# Patient Record
Sex: FEMALE | Race: WHITE | NOT HISPANIC OR LATINO | Employment: OTHER | URBAN - METROPOLITAN AREA
[De-identification: names, ages, dates, MRNs, and addresses within clinical notes are randomized per-mention and may not be internally consistent; named-entity substitution may affect disease eponyms.]

---

## 2018-05-30 ENCOUNTER — HISTORICAL (OUTPATIENT)
Dept: ADMINISTRATIVE | Facility: HOSPITAL | Age: 68
End: 2018-05-30

## 2018-05-30 LAB
ALBUMIN SERPL-MCNC: 3.5 GM/DL (ref 3.4–5)
ALBUMIN/GLOB SERPL: 1 {RATIO}
ALP SERPL-CCNC: 75 UNIT/L (ref 38–126)
ALT SERPL-CCNC: 19 UNIT/L (ref 12–78)
AST SERPL-CCNC: 13 UNIT/L (ref 15–37)
BILIRUB SERPL-MCNC: 0.6 MG/DL (ref 0.2–1)
BILIRUBIN DIRECT+TOT PNL SERPL-MCNC: 0.1 MG/DL (ref 0–0.2)
BILIRUBIN DIRECT+TOT PNL SERPL-MCNC: 0.5 MG/DL (ref 0–0.8)
BUN SERPL-MCNC: 15 MG/DL (ref 7–18)
CALCIUM SERPL-MCNC: 8.8 MG/DL (ref 8.5–10.1)
CHLORIDE SERPL-SCNC: 108 MMOL/L (ref 98–107)
CHOLEST SERPL-MCNC: 160 MG/DL (ref 0–200)
CHOLEST/HDLC SERPL: 3.3 {RATIO} (ref 0–4)
CO2 SERPL-SCNC: 27 MMOL/L (ref 21–32)
CREAT SERPL-MCNC: 0.72 MG/DL (ref 0.55–1.02)
DEPRECATED CALCIDIOL+CALCIFEROL SERPL-MC: 27 NG/ML (ref 30–80)
GLOBULIN SER-MCNC: 3.4 GM/DL (ref 2.4–3.5)
GLUCOSE SERPL-MCNC: 94 MG/DL (ref 74–106)
HDLC SERPL-MCNC: 48 MG/DL (ref 35–60)
LDLC SERPL CALC-MCNC: 98 MG/DL (ref 0–129)
POTASSIUM SERPL-SCNC: 4.4 MMOL/L (ref 3.5–5.1)
PROT SERPL-MCNC: 6.9 GM/DL (ref 6.4–8.2)
SODIUM SERPL-SCNC: 143 MMOL/L (ref 136–145)
TRIGL SERPL-MCNC: 70 MG/DL (ref 30–150)
VLDLC SERPL CALC-MCNC: 14 MG/DL

## 2018-06-04 ENCOUNTER — HISTORICAL (OUTPATIENT)
Dept: ADMINISTRATIVE | Facility: HOSPITAL | Age: 68
End: 2018-06-04

## 2018-07-25 ENCOUNTER — HISTORICAL (OUTPATIENT)
Dept: ADMINISTRATIVE | Facility: HOSPITAL | Age: 68
End: 2018-07-25

## 2018-08-14 ENCOUNTER — HISTORICAL (OUTPATIENT)
Dept: RADIOLOGY | Facility: HOSPITAL | Age: 68
End: 2018-08-14

## 2018-09-28 ENCOUNTER — HISTORICAL (OUTPATIENT)
Dept: RADIOLOGY | Facility: HOSPITAL | Age: 68
End: 2018-09-28

## 2018-11-20 ENCOUNTER — HISTORICAL (OUTPATIENT)
Dept: LAB | Facility: HOSPITAL | Age: 68
End: 2018-11-20

## 2018-11-20 LAB
ABS NEUT (OLG): 4.27 X10(3)/MCL (ref 2.1–9.2)
ALBUMIN SERPL-MCNC: 3.6 GM/DL (ref 3.4–5)
ALBUMIN/GLOB SERPL: 1.1 RATIO (ref 1.1–2)
ALP SERPL-CCNC: 74 UNIT/L (ref 38–126)
ALT SERPL-CCNC: 23 UNIT/L (ref 12–78)
AST SERPL-CCNC: 16 UNIT/L (ref 15–37)
BASOPHILS # BLD AUTO: 0 X10(3)/MCL (ref 0–0.2)
BASOPHILS NFR BLD AUTO: 0 %
BILIRUB SERPL-MCNC: 0.3 MG/DL (ref 0.2–1)
BILIRUBIN DIRECT+TOT PNL SERPL-MCNC: 0.1 MG/DL (ref 0–0.5)
BILIRUBIN DIRECT+TOT PNL SERPL-MCNC: 0.2 MG/DL (ref 0–0.8)
BUN SERPL-MCNC: 15 MG/DL (ref 7–18)
CALCIUM SERPL-MCNC: 8.6 MG/DL (ref 8.5–10.1)
CHLORIDE SERPL-SCNC: 109 MMOL/L (ref 98–107)
CO2 SERPL-SCNC: 25 MMOL/L (ref 21–32)
CREAT SERPL-MCNC: 0.8 MG/DL (ref 0.55–1.02)
EOSINOPHIL # BLD AUTO: 0.5 X10(3)/MCL (ref 0–0.9)
EOSINOPHIL NFR BLD AUTO: 6 %
ERYTHROCYTE [DISTWIDTH] IN BLOOD BY AUTOMATED COUNT: 12.5 % (ref 11.5–17)
GLOBULIN SER-MCNC: 3.2 GM/DL (ref 2.4–3.5)
GLUCOSE SERPL-MCNC: 98 MG/DL (ref 74–106)
HCT VFR BLD AUTO: 42.3 % (ref 37–47)
HGB BLD-MCNC: 13.6 GM/DL (ref 12–16)
LYMPHOCYTES # BLD AUTO: 3.2 X10(3)/MCL (ref 0.6–4.6)
LYMPHOCYTES NFR BLD AUTO: 37 %
MCH RBC QN AUTO: 29.6 PG (ref 27–31)
MCHC RBC AUTO-ENTMCNC: 32.2 GM/DL (ref 33–36)
MCV RBC AUTO: 92 FL (ref 80–94)
MONOCYTES # BLD AUTO: 0.7 X10(3)/MCL (ref 0.1–1.3)
MONOCYTES NFR BLD AUTO: 8 %
NEUTROPHILS # BLD AUTO: 4.27 X10(3)/MCL (ref 2.1–9.2)
NEUTROPHILS NFR BLD AUTO: 49 %
PLATELET # BLD AUTO: 368 X10(3)/MCL (ref 130–400)
PMV BLD AUTO: 9.4 FL (ref 9.4–12.4)
POTASSIUM SERPL-SCNC: 3.8 MMOL/L (ref 3.5–5.1)
PROT SERPL-MCNC: 6.8 GM/DL (ref 6.4–8.2)
RBC # BLD AUTO: 4.6 X10(6)/MCL (ref 4.2–5.4)
SODIUM SERPL-SCNC: 142 MMOL/L (ref 136–145)
TSH SERPL-ACNC: 1.98 MIU/L (ref 0.36–3.74)
WBC # SPEC AUTO: 8.8 X10(3)/MCL (ref 4.5–11.5)

## 2018-11-29 ENCOUNTER — HISTORICAL (OUTPATIENT)
Dept: CARDIOLOGY | Facility: HOSPITAL | Age: 68
End: 2018-11-29

## 2019-10-25 ENCOUNTER — HISTORICAL (OUTPATIENT)
Dept: ADMINISTRATIVE | Facility: HOSPITAL | Age: 69
End: 2019-10-25

## 2019-10-25 LAB
ALBUMIN SERPL-MCNC: 3.3 GM/DL (ref 3.4–5)
ALBUMIN/GLOB SERPL: 1.1 {RATIO}
ALP SERPL-CCNC: 80 UNIT/L (ref 38–126)
ALT SERPL-CCNC: 23 UNIT/L (ref 12–78)
AST SERPL-CCNC: 16 UNIT/L (ref 15–37)
BILIRUB SERPL-MCNC: 1.2 MG/DL (ref 0.2–1)
BILIRUBIN DIRECT+TOT PNL SERPL-MCNC: 0.2 MG/DL (ref 0–0.2)
BILIRUBIN DIRECT+TOT PNL SERPL-MCNC: 1 MG/DL (ref 0–0.8)
BUN SERPL-MCNC: 10 MG/DL (ref 7–18)
CALCIUM SERPL-MCNC: 8.9 MG/DL (ref 8.5–10.1)
CHLORIDE SERPL-SCNC: 107 MMOL/L (ref 98–107)
CHOLEST SERPL-MCNC: 169 MG/DL (ref 0–200)
CHOLEST/HDLC SERPL: 2.9 {RATIO} (ref 0–4)
CO2 SERPL-SCNC: 28 MMOL/L (ref 21–32)
CREAT SERPL-MCNC: 0.61 MG/DL (ref 0.55–1.02)
DEPRECATED CALCIDIOL+CALCIFEROL SERPL-MC: 32.66 NG/ML (ref 30–80)
GLOBULIN SER-MCNC: 3.1 GM/DL (ref 2.4–3.5)
GLUCOSE SERPL-MCNC: 100 MG/DL (ref 74–106)
HDLC SERPL-MCNC: 58 MG/DL (ref 35–60)
LDLC SERPL CALC-MCNC: 97 MG/DL (ref 0–129)
POTASSIUM SERPL-SCNC: 4.6 MMOL/L (ref 3.5–5.1)
PROT SERPL-MCNC: 6.4 GM/DL (ref 6.4–8.2)
SODIUM SERPL-SCNC: 142 MMOL/L (ref 136–145)
TRIGL SERPL-MCNC: 72 MG/DL (ref 30–150)
TSH SERPL-ACNC: 1.6 MIU/L (ref 0.36–3.74)
VLDLC SERPL CALC-MCNC: 14 MG/DL

## 2020-09-23 ENCOUNTER — HISTORICAL (OUTPATIENT)
Dept: ADMINISTRATIVE | Facility: HOSPITAL | Age: 70
End: 2020-09-23

## 2020-09-23 LAB
ALBUMIN SERPL-MCNC: 3.4 GM/DL (ref 3.4–5)
ALBUMIN/GLOB SERPL: 1.4 RATIO (ref 1.1–2)
ALP SERPL-CCNC: 57 UNIT/L (ref 40–150)
ALT SERPL-CCNC: 24 UNIT/L (ref 0–55)
AST SERPL-CCNC: 19 UNIT/L (ref 5–34)
BILIRUB SERPL-MCNC: 0.4 MG/DL
BILIRUBIN DIRECT+TOT PNL SERPL-MCNC: 0.2 MG/DL (ref 0–0.5)
BILIRUBIN DIRECT+TOT PNL SERPL-MCNC: 0.2 MG/DL (ref 0–0.8)
BUN SERPL-MCNC: 9.9 MG/DL (ref 9.8–20.1)
CALCIUM SERPL-MCNC: 8.3 MG/DL (ref 8.4–10.2)
CHLORIDE SERPL-SCNC: 109 MMOL/L (ref 98–107)
CHOLEST SERPL-MCNC: 145 MG/DL
CHOLEST/HDLC SERPL: 4 {RATIO} (ref 0–5)
CO2 SERPL-SCNC: 27 MMOL/L (ref 23–31)
CREAT SERPL-MCNC: 0.63 MG/DL (ref 0.55–1.02)
DEPRECATED CALCIDIOL+CALCIFEROL SERPL-MC: 37.7 NG/ML (ref 6.6–49.9)
GLOBULIN SER-MCNC: 2.4 GM/DL (ref 2.4–3.5)
GLUCOSE SERPL-MCNC: 109 MG/DL (ref 82–115)
HDLC SERPL-MCNC: 37 MG/DL (ref 35–60)
LDLC SERPL CALC-MCNC: 99 MG/DL (ref 50–140)
POTASSIUM SERPL-SCNC: 4.3 MMOL/L (ref 3.5–5.1)
PROT SERPL-MCNC: 5.8 GM/DL (ref 5.8–7.6)
SODIUM SERPL-SCNC: 140 MMOL/L (ref 136–145)
TRIGL SERPL-MCNC: 46 MG/DL (ref 37–140)
VLDLC SERPL CALC-MCNC: 9 MG/DL

## 2020-10-20 ENCOUNTER — HISTORICAL (OUTPATIENT)
Dept: ADMINISTRATIVE | Facility: HOSPITAL | Age: 70
End: 2020-10-20

## 2020-12-08 ENCOUNTER — HISTORICAL (OUTPATIENT)
Dept: RADIOLOGY | Facility: HOSPITAL | Age: 70
End: 2020-12-08

## 2020-12-18 LAB — CRC RECOMMENDATION EXT: NORMAL

## 2021-05-13 ENCOUNTER — HISTORICAL (OUTPATIENT)
Dept: RADIOLOGY | Facility: HOSPITAL | Age: 71
End: 2021-05-13

## 2021-05-13 LAB — BMD RECOMMENDATION EXT: NORMAL

## 2021-05-31 ENCOUNTER — HISTORICAL (OUTPATIENT)
Dept: INTENSIVE CARE | Facility: HOSPITAL | Age: 71
End: 2021-05-31

## 2021-06-16 ENCOUNTER — HISTORICAL (OUTPATIENT)
Dept: INTENSIVE CARE | Facility: HOSPITAL | Age: 71
End: 2021-06-16

## 2021-11-04 ENCOUNTER — HISTORICAL (OUTPATIENT)
Dept: ADMINISTRATIVE | Facility: HOSPITAL | Age: 71
End: 2021-11-04

## 2021-11-04 LAB
ABS NEUT (OLG): 4.15 X10(3)/MCL (ref 2.1–9.2)
ALBUMIN SERPL-MCNC: 3.5 GM/DL (ref 3.4–4.8)
ALBUMIN/GLOB SERPL: 1.2 RATIO (ref 1.1–2)
ALP SERPL-CCNC: 69 UNIT/L (ref 40–150)
ALT SERPL-CCNC: 27 UNIT/L (ref 0–55)
AST SERPL-CCNC: 23 UNIT/L (ref 5–34)
BASOPHILS # BLD AUTO: 0 X10(3)/MCL (ref 0–0.2)
BASOPHILS NFR BLD AUTO: 0 %
BILIRUB SERPL-MCNC: 0.5 MG/DL
BILIRUBIN DIRECT+TOT PNL SERPL-MCNC: 0.2 MG/DL (ref 0–0.5)
BILIRUBIN DIRECT+TOT PNL SERPL-MCNC: 0.3 MG/DL (ref 0–0.8)
BUN SERPL-MCNC: 10.9 MG/DL (ref 9.8–20.1)
CALCIUM SERPL-MCNC: 9.2 MG/DL (ref 8.7–10.5)
CHLORIDE SERPL-SCNC: 108 MMOL/L (ref 98–107)
CHOLEST SERPL-MCNC: 156 MG/DL
CHOLEST/HDLC SERPL: 4 {RATIO} (ref 0–5)
CO2 SERPL-SCNC: 28 MMOL/L (ref 23–31)
CREAT SERPL-MCNC: 0.69 MG/DL (ref 0.55–1.02)
EOSINOPHIL # BLD AUTO: 0.2 X10(3)/MCL (ref 0–0.9)
EOSINOPHIL NFR BLD AUTO: 3 %
ERYTHROCYTE [DISTWIDTH] IN BLOOD BY AUTOMATED COUNT: 13 % (ref 11.5–17)
GLOBULIN SER-MCNC: 2.9 GM/DL (ref 2.4–3.5)
GLUCOSE SERPL-MCNC: 107 MG/DL (ref 82–115)
HCT VFR BLD AUTO: 40.5 % (ref 37–47)
HDLC SERPL-MCNC: 42 MG/DL (ref 35–60)
HGB BLD-MCNC: 13.2 GM/DL (ref 12–16)
LDLC SERPL CALC-MCNC: 95 MG/DL (ref 50–140)
LYMPHOCYTES # BLD AUTO: 1.9 X10(3)/MCL (ref 0.6–4.6)
LYMPHOCYTES NFR BLD AUTO: 27 %
MCH RBC QN AUTO: 30.2 PG (ref 27–31)
MCHC RBC AUTO-ENTMCNC: 32.6 GM/DL (ref 33–36)
MCV RBC AUTO: 92.7 FL (ref 80–94)
MONOCYTES # BLD AUTO: 0.8 X10(3)/MCL (ref 0.1–1.3)
MONOCYTES NFR BLD AUTO: 11 %
NEUTROPHILS # BLD AUTO: 4.15 X10(3)/MCL (ref 2.1–9.2)
NEUTROPHILS NFR BLD AUTO: 59 %
PLATELET # BLD AUTO: 369 X10(3)/MCL (ref 130–400)
PMV BLD AUTO: 9.2 FL (ref 9.4–12.4)
POTASSIUM SERPL-SCNC: 4.6 MMOL/L (ref 3.5–5.1)
PROT SERPL-MCNC: 6.4 GM/DL (ref 5.8–7.6)
RBC # BLD AUTO: 4.37 X10(6)/MCL (ref 4.2–5.4)
SODIUM SERPL-SCNC: 143 MMOL/L (ref 136–145)
TRIGL SERPL-MCNC: 95 MG/DL (ref 37–140)
VLDLC SERPL CALC-MCNC: 19 MG/DL
WBC # SPEC AUTO: 7.1 X10(3)/MCL (ref 4.5–11.5)

## 2022-04-10 ENCOUNTER — HISTORICAL (OUTPATIENT)
Dept: ADMINISTRATIVE | Facility: HOSPITAL | Age: 72
End: 2022-04-10

## 2022-04-29 VITALS
SYSTOLIC BLOOD PRESSURE: 126 MMHG | OXYGEN SATURATION: 96 % | WEIGHT: 190 LBS | WEIGHT: 191 LBS | HEIGHT: 67 IN | SYSTOLIC BLOOD PRESSURE: 130 MMHG | BODY MASS INDEX: 30.29 KG/M2 | BODY MASS INDEX: 29.82 KG/M2 | HEIGHT: 67 IN | HEIGHT: 67 IN | WEIGHT: 193 LBS | DIASTOLIC BLOOD PRESSURE: 64 MMHG | SYSTOLIC BLOOD PRESSURE: 140 MMHG | DIASTOLIC BLOOD PRESSURE: 68 MMHG | BODY MASS INDEX: 29.98 KG/M2 | DIASTOLIC BLOOD PRESSURE: 79 MMHG

## 2022-05-03 NOTE — HISTORICAL [facility] C[facility]ER
This is a historical note converted from Stephan. Formatting and pictures may have been removed.  Please reference Stephan for original formatting and attached multimedia. Chief Complaint  left hand ring finger pain, started beginning of october. no injury, no previous surgery to her finger.  History of Present Illness  This 70-year-old comes in for her?left hand.? She is complaining of pain in her left fourth finger.? She also had a myriad of other complaints.? Patient states that she?had bilateral mastectomies with implants in the past.? She is complaining of pain in her axilla radiating down of?her arm. ?She is right?hand dominant.  Review of Systems  Constitutional: No fever, weakness, or fatigue.  Ear/Nose/Mouth/Throat: No nasal congestion or sore throat.  Respiratory: No shortness of breath or cough.  Cardiovascular: No chest pain, palpitations, or peripheral edema.  Gastrointestinal: No nausea, vomiting, or abdominal pain.  Genitourinary: No dysuria.  Musculoskeletal: See current complaints?multiple joint complaints  Integumentary: Negative.  Physical Exam  Vitals & Measurements  T:?97.4? ?F (Oral)? HR:?74(Peripheral)? BP:?140/79?  HT:?170.00?cm? WT:?86.630?kg? BMI:?29.98?  Physical examination shows that the patient has a positive Tinels for ulnar nerve entrapment at the wrist. ?She has fairly classic ulnar nerve symptoms.? The patient has weakness in the long flexors?to fingers 4 and 5.? She has no evidence of carpal tunnel syndrome.? She has no evidence of?cervical radiculopathy.? She is otherwise motor and sensory intact.? Patient does have early arthritic changes in her fingers?more symptomatic at the left fourth PIP joint.? She has no abnormalities noted. ?There is no instability at her left?PIP joint and the fourth finger.  ?  AP lateral and oblique of the left hand shows early arthritic changes in the DIP and PIP joints of her left hand. ?She has no significant CMC  arthritis.  Assessment/Plan  1.?Primary osteoarthritis, left hand?M19.042  ?The findings were reviewed with the patient and she expressed understanding.? Patient?is advised to use over-the-counter anti-inflammatory gel for her fourth finger.? As far as her ulnar nerve symptoms I would recommend nerve conduction studies.? The patient is considering having?these done but does not want to schedule today.? Should she need?follow-up for her back or her knee I would recommend she see Dr. Bullock.? She will call and let me know how she would like to proceed.  ?  Components of this note were documented using voice recognition systems and are subject to errors not corrected at proof reading. ?Please contact the author for any clarifications.  Ordered:  Office/Outpatient Visit Level 3 New [id number] PC, Primary osteoarthritis, left hand  Neuritis of left ulnar nerve, [facility], 10/20/20 10:28:00 CDT  ?  2.?Neuritis of left ulnar nerve?G56.22  Ordered:  Office/Outpatient Visit Level 3 New [id number] PC, Primary osteoarthritis, left hand  Neuritis of left ulnar nerve, [facility], 10/20/20 10:28:00 CDT  ?  Orders:  Clinic Follow-up PRN, 10/20/20 10:28:00 CDT, Future Order, [facility]  XR Hand Left Minimum 3 Views, Routine, 10/20/20 9:19:00 CDT, None, Ambulatory, Rad Type, Left hand pain, Not Scheduled, 10/20/20 9:19:00 CDT  Referrals  Clinic Follow-up PRN, 10/20/20 10:28:00 CDT, Future Order, [facility]   Problem List/Past Medical History  Ongoing  Allergic rhinitis  Chronic sinusitis  DCIS (ductal carcinoma in situ)  Depression  Esophageal spasm  GERD (gastroesophageal reflux disease)  Intraductal carcinoma of breast  Loose body in right elbow  Neuritis of left ulnar nerve  DRAI (obstructive sleep apnea)  Osteopenia  Pain of breast  Primary osteoarthritis, left hand  Radial tunnel syndrome  Recurrent sinusitis  Right lateral epicondylitis  Shingles  Trigger finger, right middle finger  Historical  No  qualifying data  Procedure/Surgical History  Nasal endoscopy with nasal polypectomy (09/18/2019)  eyelid surgery (04.2018)  Bilateral mastectomy (01/01/1998)  Simple mastectomy (1998)  Bilateral tubal ligation  Breast reconstruction  Modified radical mastectomy   Medications  Dexilant 60 mg oral delayed release capsule, See Instructions, 3 refills  Flonase 50 mcg/inh nasal spray, 2 spray(s), Nasal, Daily, 11 refills  Gingko Biloba, See Instructions  levocetirizine 5 mg oral tablet, See Instructions  Lexapro 10 mg oral tablet, 10 mg= 1 tab(s), Oral, Daily, 3 refills  montelukast 10 mg oral TABLET, See Instructions, 11 refills  raloxifene 60 mg oral tablet, 60 mg= 1 tab(s), Oral, Daily, 3 refills  Resveratrol 100mg  Allergies  No Known Allergies  Social History  Abuse/Neglect  No, No, Yes, 10/20/2020  Alcohol  Current, Wine, Daily, 2 drinks/episode average., 03/12/2015  Employment/School  Employed, Work/School description: [employer] [place]. Highest education level: Some college., 03/12/2015  Exercise - Does not exercise, 03/12/2015  Exercise duration: 0., 03/12/2015  Home/Environment  Nutrition/Health  Regular, 03/12/2015  Sexual  Sexually active: No., 10/22/2015  Substance Use - Denies Substance Abuse, 03/12/2015  Tobacco - Denies Tobacco Use, 03/12/2015  Never (less than 100 in lifetime), N/A, 10/20/2020  Family History  Alzheimers disease: Mother.  Arrhythmia.: Sister.Negative: Sister.  Atrial fibrillation.: Mother.  Cancer.: Sister.  Cirrhosis.: Father.  Hypertension.: Mother.  Pancreatic cancer.: Sister.  Primary malignant neoplasm of breast: Brother.  Immunizations  Vaccine Date Status   tetanus/diphtheria/pertussis, acel(Tdap) 02/02/2015 Recorded   tetanus/diphth/pertuss (Tdap) adult/adol 02/01/2015 Recorded   pneumococcal 23-polyvalent vaccine 01/01/2011 Recorded   Health Maintenance  Health Maintenance  ???Pending?(in the next year)  ??? ??OverDue  ??? ? ? ?Zoster Vaccine  due??11/17/17??and every?  ??? ? ? ?Pneumococcal Vaccine due??11/17/18??and every?  ??? ? ? ?Medicare Annual Wellness Exam due??06/04/19??and every 1??year(s)  ??? ? ? ?Influenza Vaccine due??10/01/19??and every 1??day(s)  ??? ? ? ?Cognitive Screening due??01/02/20??and every 1??year(s)  ??? ? ? ?Bone Density Screening due??08/14/20??and every 2??year(s)  ??? ??Due In Future?  ??? ? ? ?Colorectal Screening not due until??12/18/20??and every 5??year(s)  ??? ? ? ?Obesity Screening not due until??01/01/21??and every 1??year(s)  ??? ? ? ?Advance Directive not due until??01/02/21??and every 1??year(s)  ??? ? ? ?Alcohol Misuse Screening not due until??01/02/21??and every 1??year(s)  ??? ? ? ?Fall Risk Assessment not due until??01/02/21??and every 1??year(s)  ??? ? ? ?Functional Assessment not due until??01/02/21??and every 1??year(s)  ??? ? ? ?ADL Screening not due until??02/11/21??and every 1??year(s)  ??? ? ? ?Blood Pressure Screening not due until??06/30/21??and every 1??year(s)  ??? ? ? ?Body Mass Index Check not due until??06/30/21??and every 1??year(s)  ??? ? ? ?Aspirin Therapy for CVD Prevention not due until??06/30/21??and every 1??year(s)  ???Satisfied?(in the past 1 year)  ??? ??Satisfied?  ??? ? ? ?ADL Screening on??02/11/20.??Satisfied by Shelia LPN, Whitley P.  ??? ? ? ?Advance Directive on??02/11/20.??Satisfied by Whitley Bass LPN P.  ??? ? ? ?Alcohol Misuse Screening on??02/11/20.??Satisfied by Whitley Bass LPN P.  ??? ? ? ?Aspirin Therapy for CVD Prevention on??06/30/20.??Satisfied by Whitley Bass LPN P.  ??? ? ? ?Blood Pressure Screening on??10/20/20.??Satisfied by Msi Coombs LPN.  ??? ? ? ?Body Mass Index Check on??10/20/20.??Satisfied by Mis Coombs LPN.  ??? ? ? ?Depression Screening on??06/30/20.??Satisfied by Whitley Bass LPN P.  ??? ? ? ?Diabetes Screening on??09/23/20.??Satisfied by Barby Aviles MT  ??? ? ? ?Fall Risk Assessment on??10/20/20.??Satisfied by Malvin PATEL,  Mis STARK.  ??? ? ? ?Functional Assessment on??06/30/20.??Satisfied by Whitley Bass LPN.  ??? ? ? ?Lipid Screening on??09/23/20.??Satisfied by Barby Aviles MT.  ??? ? ? ?Obesity Screening on??10/20/20.??Satisfied by Mis Coombs LPN.  ?

## 2022-05-03 NOTE — HISTORICAL [facility] C[facility]
This is a historical note converted from Stephan. Formatting and pictures may have been removed.  Please reference Stephan for original formatting and attached multimedia. Chief Complaint  right humerus pain x 6 months-no injury-referred by Dr. Caitlyn Colon  History of Present Illness  This 68-year-old comes in for her right shoulder and elbow.? She has had pain for almost a year.? She points to her distal humerus and right elbow as a source of pain. ?She is right-hand dominant. ?She has recently noted progressive weakness.? She also has noted triggering in her right third finger.? She is referred by Dr. Colon.  Review of Systems  Constitutional: No fever, weakness, or fatigue.  Ear/Nose/Mouth/Throat: No nasal congestion or sore throat.  Respiratory: No shortness of breath or cough.  Cardiovascular: No chest pain, palpitations, or peripheral edema.  Gastrointestinal: No nausea, vomiting, or abdominal pain.  Genitourinary: No dysuria.  Musculoskeletal: See current complaints  Integumentary: Negative.  Physical Exam  Vitals & Measurements  HR:?68(Peripheral)? BP:?126/64?  HT:?170?cm? HT:?170?cm? WT:?86.18?kg? WT:?86.18?kg? BMI:?29.82?  Physical examination shows that she has no tenderness at her shoulder. ?She has full active range of motion?with no evidence of impingement tendinitis.? She has no evidence of AC joint arthritis clinically.? She has no evidence of a rotator cuff tear.  ?  Examination of her right elbow shows that she has severe lateral epicondylitis.? She is also noted to have radial tunnel syndrome.? She has 1/2 cm atrophy of her right forearm compared to her left forearm.? She is right-hand dominant.? She has no true elbow joint effusion. ?She has no fluid in her olecranon bursa.? She has no evidence of ligamentous laxity about her elbow.  Examination of her right?hand shows that she has a classic right third trigger finger.? Otherwise her hand examination is normal.  ?  Review of x-rays of the right  shoulder shows that the patient has early AC joint arthritis.? 3 views of the right elbow shows that the patient has 2 loose bodies in the lateral epicondylar ridge?probably incorporated within the common extensor tendon.  Assessment/Plan  1.?Right lateral epicondylitis  ? The findings were reviewed with the patient and she expressed understanding. ?We discussed options for treatment including?observation, physical therapy and/or surgery.? I believe the patient will need surgery for removal of her loose bodies and release of the radial tunnel as well as release of the right third trigger finger.? She states that work prohibits her from taking time off?at this exact time that she would like to?come back as needed.? I asked her to call if her symptoms worsen. ?I asked her to call if there is anything further I can do for her.  Ordered:  Office/Outpatient Visit Level 4 Established [id number] PC, Right lateral epicondylitis  Loose body in right elbow  Radial tunnel syndrome  Trigger finger, right middle finger, [facility] [place], 07/25/18 15:13:00 CDT  ?  2.?Loose body in right elbow  Ordered:  Office/Outpatient Visit Level 4 Established [id number] PC, Right lateral epicondylitis  Loose body in right elbow  Radial tunnel syndrome  Trigger finger, right middle finger, [facility] [place], 07/25/18 15:13:00 CDT  ?  3.?Radial tunnel syndrome  Ordered:  Office/Outpatient Visit Level 4 Established [id number] PC, Right lateral epicondylitis  Loose body in right elbow  Radial tunnel syndrome  Trigger finger, right middle finger, [facility] [place], 07/25/18 15:13:00 CDT  ?  4.?Trigger finger, right middle finger  Ordered:  Office/Outpatient Visit Level 4 Established [id number] PC, Right lateral epicondylitis  Loose body in right elbow  Radial tunnel syndrome  Trigger finger, right middle finger, [facility] [place], 07/25/18 15:13:00 CDT  ?  Orders:  Clinic Follow-up PRN, 07/25/18 15:13:00 CDT, Future Order, [facility]  [place]  XR Elbow Right Minimum 3 Views, Routine, 07/25/18 14:51:00 CDT, Pain, None, Ambulatory, Rad Type, Right elbow pain, Not Scheduled, 07/25/18 14:51:00 CDT   Problem List/Past Medical History  Ongoing  Allergic rhinitis  DCIS (ductal carcinoma in situ)  Depression  Esophageal spasm  GERD (gastroesophageal reflux disease)  Loose body in right elbow  DARI (obstructive sleep apnea)  Osteopenia  Radial tunnel syndrome  Recurrent sinusitis  Right lateral epicondylitis  Shingles  Trigger finger, right middle finger  Historical  No qualifying data  Procedure/Surgical History  eyelid surgery (04/2018)  Simple mastectomy (1998)  Bilateral tubal ligation  Breast reconstruction  Modified radical mastectomy   Medications  Alpha Lipoic Acid, 200 mg, Oral, Daily  Dexilant 60 mg oral delayed release capsule, See Instructions, 3 refills  Fish Oil 1200 mg oral capsule, 1200 mg= 1 cap(s), Oral, Daily  Gingko Biloba, See Instructions  Kelp, See Instructions  levocetirizine 5 mg oral tablet, See Instructions, 12 refills  montelukast 10 mg oral TABLET, See Instructions, 11 refills  raloxifene 60 mg oral tablet, See Instructions, 12 refills  Resveratrol 100mg  Trintellix 10 mg oral tablet, 10 mg= 1 tab(s), Oral, Daily, 3 refills  Turmeric 80mg  Allergies  No Known Allergies  Social History  Alcohol  Current, Wine, Daily, 2 drinks/episode average., 03/12/2015  Employment/School  Employed, Work/School description: [employer]. Highest education level: Some college., 03/12/2015  Exercise - Does not exercise, 03/12/2015  Exercise duration: 0., 03/12/2015  Home/Environment  Nutrition/Health  Regular, 03/12/2015  Sexual  Sexually active: No., 10/22/2015  Substance Abuse - Denies Substance Abuse, 03/12/2015  Tobacco - Denies Tobacco Use, 03/12/2015  Never smoker Use:., 06/04/2018  Never smoker, 03/12/2015  Family History  Alzheimers disease: Mother.  Arrhythmia.: Sister.Negative: Sister.  Atrial  fibrillation.: Mother.  Cancer.: Sister.  Cirrhosis.: Father.  Hypertension.: Mother.  Pancreatic cancer.: Sister.  Primary malignant neoplasm of breast: Brother.  Immunizations  Vaccine Date Status   tetanus/diphth/pertuss (Tdap) adult/adol 02/01/2015 Recorded   pneumococcal 23-polyvalent vaccine 01/01/2011 Recorded   Health Maintenance  Health Maintenance  ???Pending?(in the next year)  ??? ??Due?  ??? ? ? ?Functional Assessment due??07/25/18??and every 1??year(s)  ??? ??Refused?  ??? ? ? ?Zoster Vaccine due??11/17/17??and every 100??year(s)  ??? ? ? ?Pneumococcal Vaccine due??11/17/18??and every 100??year(s)  ??? ??Due In Future?  ??? ? ? ?Aspirin Therapy for CVD Prevention not due until??03/08/19??and every 1??year(s)  ??? ? ? ?Alcohol Misuse Screening not due until??05/19/19??and every 1??year(s)  ??? ? ? ?Fall Risk Assessment not due until??07/20/19??and every 1??year(s)  ???Satisfied?(in the past 1 year)  ??? ??Satisfied?  ??? ? ? ?Alcohol Misuse Screening on??03/08/18.??Satisfied by Marii Hernandez LPN  ??? ? ? ?Aspirin Therapy for CVD Prevention on??03/08/18.??Satisfied by Marii Hernandez LPN??Reason: Expectation Satisfied Elsewhere  ??? ? ? ?Blood Pressure Screening on??07/25/18.??Satisfied by Rima Wilson  ??? ? ? ?Body Mass Index Check on??07/25/18.??Satisfied by Rima Wilson  ??? ? ? ?Bone Density Screening on??03/12/18.??Satisfied by Valery Espinoza NP  ??? ? ? ?Depression Screening on??07/20/18.??Satisfied by Shelia LPN, Whitley P.  ??? ? ? ?Diabetes Screening on??05/30/18.??Satisfied by Cherelle Dalton MT  ??? ? ? ?Fall Risk Assessment on??07/20/18.??Satisfied by Whitley Bass LPN  ??? ? ? ?Lipid Screening on??05/30/18.??Satisfied by Cherelle Dalton MT  ??? ? ? ?Obesity Screening on??07/25/18.??Satisfied by Rima Wilson  ??? ??Refused?  ??? ? ? ?Zoster Vaccine on??06/04/18.??Recorded for Tracy Montez LPN??Reason: Patient Refuses  ?  ?

## 2022-06-29 DIAGNOSIS — Z78.0 POST-MENOPAUSE: Primary | ICD-10-CM

## 2022-06-29 RX ORDER — RALOXIFENE HYDROCHLORIDE 60 MG/1
60 TABLET, FILM COATED ORAL DAILY
Qty: 90 TABLET | Refills: 3 | Status: SHIPPED | OUTPATIENT
Start: 2022-06-29 | End: 2022-07-01

## 2022-06-29 RX ORDER — RALOXIFENE HYDROCHLORIDE 60 MG/1
60 TABLET, FILM COATED ORAL DAILY
COMMUNITY
Start: 2022-04-04 | End: 2022-06-29 | Stop reason: SDUPTHER

## 2022-08-02 DIAGNOSIS — G47.33 OSA ON CPAP: Primary | ICD-10-CM

## 2022-09-08 RX ORDER — LEVOCETIRIZINE DIHYDROCHLORIDE 5 MG/1
TABLET, FILM COATED ORAL
COMMUNITY
Start: 2022-06-27 | End: 2022-09-29 | Stop reason: SDUPTHER

## 2022-09-08 RX ORDER — FLUTICASONE PROPIONATE 50 MCG
SPRAY, SUSPENSION (ML) NASAL
COMMUNITY
Start: 2022-03-29 | End: 2023-06-07 | Stop reason: SDUPTHER

## 2022-09-08 RX ORDER — ESCITALOPRAM OXALATE 10 MG/1
TABLET ORAL
COMMUNITY
Start: 2021-06-16 | End: 2022-09-30

## 2022-09-29 DIAGNOSIS — J30.2 SEASONAL ALLERGIES: Primary | ICD-10-CM

## 2022-09-29 RX ORDER — LEVOCETIRIZINE DIHYDROCHLORIDE 5 MG/1
TABLET, FILM COATED ORAL
Qty: 90 TABLET | Refills: 3 | Status: SHIPPED | OUTPATIENT
Start: 2022-09-29 | End: 2023-07-18

## 2022-10-27 ENCOUNTER — OFFICE VISIT (OUTPATIENT)
Dept: NEUROLOGY | Facility: CLINIC | Age: 72
End: 2022-10-27
Payer: MEDICARE

## 2022-10-27 DIAGNOSIS — G47.33 OSA ON CPAP: Primary | ICD-10-CM

## 2022-10-27 PROCEDURE — 99213 OFFICE O/P EST LOW 20 MIN: CPT | Mod: 95,,, | Performed by: NURSE PRACTITIONER

## 2022-10-27 PROCEDURE — 1159F PR MEDICATION LIST DOCUMENTED IN MEDICAL RECORD: ICD-10-PCS | Mod: CPTII,95,, | Performed by: NURSE PRACTITIONER

## 2022-10-27 PROCEDURE — 1160F RVW MEDS BY RX/DR IN RCRD: CPT | Mod: CPTII,95,, | Performed by: NURSE PRACTITIONER

## 2022-10-27 PROCEDURE — 1159F MED LIST DOCD IN RCRD: CPT | Mod: CPTII,95,, | Performed by: NURSE PRACTITIONER

## 2022-10-27 PROCEDURE — 99213 PR OFFICE/OUTPT VISIT, EST, LEVL III, 20-29 MIN: ICD-10-PCS | Mod: 95,,, | Performed by: NURSE PRACTITIONER

## 2022-10-27 PROCEDURE — 1160F PR REVIEW ALL MEDS BY PRESCRIBER/CLIN PHARMACIST DOCUMENTED: ICD-10-PCS | Mod: CPTII,95,, | Performed by: NURSE PRACTITIONER

## 2022-10-27 NOTE — PROGRESS NOTES
Subjective:         Patient ID: Sanjana Beyer is a 72 y.o. female.    Chief Complaint: annual follow up for DARI    HPI:           The patient location is: personal residence  Visit type: audiovisual      Cont to feel PAP therapy is beneficial; feels refreshed when awakening; denies EDS    Denies issues with machine    Patient is struggling with a proper mask fit; she admits it is leaking around her mouth; she is using the Resmed Airfit F30i full face mask and she has to put it so tight that it is marking her face; despite this it continues to leak. She is not willing to try a mask that has hose in front of her face. She has tried chin strap in the past and was not tolerable hence does not use. Also, current mask pushes up on her nose which is causing her nose to be sore and she is asking about a diff mask type as a result.     PAP data:  date range 2022-10/25/2022  days used >=4hr   87%  AHI 4.0  Autopap 4-20 cm    EPWORTH SLEEPINESS SCALE 10/27/2022   Sitting and reading 1   Watching TV 0   Sitting, inactive in a public place (e.g. a theatre or a meeting) 0   As a passenger in a car for an hour without a break 1   Lying down to rest in the afternoon when circumstances permit 1   Sitting and talking to someone 0   Sitting quietly after a lunch without alcohol 1   In a car, while stopped for a few minutes in traffic 0   Total score 4       This is a telemedicine note. After obtaining verbal consent/patient identification using name and , patient was treated using real time audio/video, according to [facility] protocols. IRoberto NP [distant provider], conducted the visit from location identified below. The patient participated in the visit at a non-[facility] location selected by the patient (or patients representative), identified below. I am licensed in the state where the patient stated they are located. The patient (or patients representative) stated that they understood and accepted the privacy  and security risks to their information at their location.    Distant provider was located at [facility]    Each patient to whom he or she provides medical services by telemedicine is:  (1) informed of the relationship between the physician and patient and the respective role of any other health care provider with respect to management of the patient; and (2) notified that he or she may decline to receive medical services by telemedicine and may withdraw from such care at any time.    Review of Systems: see HPI           Past Medical History:   Diagnosis Date    DARI (obstructive sleep apnea)     Shingles        Past Surgical History:   Procedure Laterality Date    MASTECTOMY N/A        Family History   Problem Relation Age of Onset    Alzheimer's disease Mother        Social History     Socioeconomic History    Marital status:    Tobacco Use    Smoking status: Never    Smokeless tobacco: Never   Substance and Sexual Activity    Alcohol use: Yes    Drug use: Never   Social History Narrative    ** Merged History Encounter **            Review of patient's allergies indicates:  No Known Allergies      Current Outpatient Medications:     EScitalopram oxalate (LEXAPRO) 10 MG tablet, TAKE 1 TABLET BY MOUTH ONCE DAILY, Disp: 90 tablet, Rfl: 1    fluticasone propionate (FLONASE) 50 mcg/actuation nasal spray, by Each Nostril route., Disp: , Rfl:     levocetirizine (XYZAL) 5 MG tablet, SMARTSI Tablet(s) By Mouth Every Evening Strength: 5 mg, Disp: 90 tablet, Rfl: 3    raloxifene (EVISTA) 60 mg tablet, TAKE ONE TABLET BY MOUTH EVERY DAY, Disp: 90 tablet, Rfl: 3     Objective:      Physical Exam:  General- Patient alert and oriented x3 in NAD  Speech - nml  HEENT- EOMI  Resp- No increased WOB noted. Not using accessory muscles.  Skin-  No Jaundice. No visible skin lesions.        Assessment/Plan:     Problem List Items Addressed This Visit          Other    DARI on CPAP - Primary    Current  Assessment & Plan     Encouraged continued use of PAP. Drowsy driving may still occur despite PAP use. Clinical follow up and replacement of supplies discussed.    I encouraged patient to go to [place] and ask about the Respir[employer]ics Dreamwear full face mask in hopes this one would fit better for her hence eliminating the air leak and the marking of her face.    DME:[facility] in 1 year                 Roberto Sanches, MSN, APRN, AGACNP-BC

## 2022-10-27 NOTE — ASSESSMENT & PLAN NOTE
Encouraged continued use of PAP. Drowsy driving may still occur despite PAP use. Clinical follow up and replacement of supplies discussed.    I encouraged patient to go to [facility] and ask about the Respirionics Dreamwear full face mask in hopes this one would fit better for her hence eliminating the air leak and the marking of her face.    DME:[facility]    [facility] in 1 year

## 2022-11-03 ENCOUNTER — DOCUMENTATION ONLY (OUTPATIENT)
Dept: ADMINISTRATIVE | Facility: HOSPITAL | Age: 72
End: 2022-11-03
Payer: MEDICARE

## 2022-11-07 ENCOUNTER — TELEPHONE (OUTPATIENT)
Dept: INTERNAL MEDICINE | Facility: CLINIC | Age: 72
End: 2022-11-07
Payer: MEDICARE

## 2022-11-07 DIAGNOSIS — Z11.59 NEED FOR HEPATITIS C SCREENING TEST: ICD-10-CM

## 2022-11-07 DIAGNOSIS — I10 HYPERTENSION, UNSPECIFIED TYPE: ICD-10-CM

## 2022-11-07 DIAGNOSIS — Z13.6 SCREENING FOR ISCHEMIC HEART DISEASE: ICD-10-CM

## 2022-11-07 DIAGNOSIS — Z00.00 WELLNESS EXAMINATION: Primary | ICD-10-CM

## 2022-11-07 NOTE — TELEPHONE ENCOUNTER
----- Message from Mary Kauffman LPN sent at 11/7/2022 11:27 AM CST -----  Regarding: XIANG Colon 11/14/22 @1:00p  Are there any outstanding tasks in the chart? Pt will need fasting labs    Is there any documentation of tasks? no    Has patient seen another physician, been to the ER, C, or admitted to hospital since last visit?    Has the patient done blood work or imaging since last visit?

## 2022-11-10 ENCOUNTER — LAB VISIT (OUTPATIENT)
Dept: LAB | Facility: HOSPITAL | Age: 72
End: 2022-11-10
Attending: INTERNAL MEDICINE
Payer: MEDICARE

## 2022-11-10 DIAGNOSIS — Z13.6 SCREENING FOR ISCHEMIC HEART DISEASE: ICD-10-CM

## 2022-11-10 DIAGNOSIS — I10 HYPERTENSION, UNSPECIFIED TYPE: ICD-10-CM

## 2022-11-10 DIAGNOSIS — Z11.59 NEED FOR HEPATITIS C SCREENING TEST: ICD-10-CM

## 2022-11-10 DIAGNOSIS — Z00.00 WELLNESS EXAMINATION: ICD-10-CM

## 2022-11-10 LAB
ALBUMIN SERPL-MCNC: 3.6 GM/DL (ref 3.4–4.8)
ALBUMIN/GLOB SERPL: 1.3 RATIO (ref 1.1–2)
ALP SERPL-CCNC: 68 UNIT/L (ref 40–150)
ALT SERPL-CCNC: 34 UNIT/L (ref 0–55)
AST SERPL-CCNC: 28 UNIT/L (ref 5–34)
BASOPHILS # BLD AUTO: 0.03 X10(3)/MCL (ref 0–0.2)
BASOPHILS NFR BLD AUTO: 0.4 %
BILIRUBIN DIRECT+TOT PNL SERPL-MCNC: 0.7 MG/DL
BUN SERPL-MCNC: 9.3 MG/DL (ref 9.8–20.1)
CALCIUM SERPL-MCNC: 9.2 MG/DL (ref 8.4–10.2)
CHLORIDE SERPL-SCNC: 107 MMOL/L (ref 98–107)
CHOLEST SERPL-MCNC: 171 MG/DL
CHOLEST/HDLC SERPL: 4 {RATIO} (ref 0–5)
CO2 SERPL-SCNC: 27 MMOL/L (ref 23–31)
CREAT SERPL-MCNC: 0.73 MG/DL (ref 0.55–1.02)
EOSINOPHIL # BLD AUTO: 0.23 X10(3)/MCL (ref 0–0.9)
EOSINOPHIL NFR BLD AUTO: 3.4 %
ERYTHROCYTE [DISTWIDTH] IN BLOOD BY AUTOMATED COUNT: 13.1 % (ref 11.5–17)
GFR SERPLBLD CREATININE-BSD FMLA CKD-EPI: >60 MLS/MIN/1.73/M2
GLOBULIN SER-MCNC: 2.7 GM/DL (ref 2.4–3.5)
GLUCOSE SERPL-MCNC: 113 MG/DL (ref 82–115)
HCT VFR BLD AUTO: 43 % (ref 37–47)
HCV AB SERPL QL IA: NONREACTIVE
HDLC SERPL-MCNC: 46 MG/DL (ref 35–60)
HGB BLD-MCNC: 13.8 GM/DL (ref 12–16)
IMM GRANULOCYTES # BLD AUTO: 0.03 X10(3)/MCL (ref 0–0.04)
IMM GRANULOCYTES NFR BLD AUTO: 0.4 %
LDLC SERPL CALC-MCNC: 111 MG/DL (ref 50–140)
LYMPHOCYTES # BLD AUTO: 2.24 X10(3)/MCL (ref 0.6–4.6)
LYMPHOCYTES NFR BLD AUTO: 33.1 %
MCH RBC QN AUTO: 30.4 PG (ref 27–31)
MCHC RBC AUTO-ENTMCNC: 32.1 MG/DL (ref 33–36)
MCV RBC AUTO: 94.7 FL (ref 80–94)
MONOCYTES # BLD AUTO: 0.77 X10(3)/MCL (ref 0.1–1.3)
MONOCYTES NFR BLD AUTO: 11.4 %
NEUTROPHILS # BLD AUTO: 3.5 X10(3)/MCL (ref 2.1–9.2)
NEUTROPHILS NFR BLD AUTO: 51.3 %
NRBC BLD AUTO-RTO: 0 %
PLATELET # BLD AUTO: 366 X10(3)/MCL (ref 130–400)
PMV BLD AUTO: 9.2 FL (ref 7.4–10.4)
POTASSIUM SERPL-SCNC: 4.5 MMOL/L (ref 3.5–5.1)
PROT SERPL-MCNC: 6.3 GM/DL (ref 5.8–7.6)
RBC # BLD AUTO: 4.54 X10(6)/MCL (ref 4.2–5.4)
SODIUM SERPL-SCNC: 141 MMOL/L (ref 136–145)
TRIGL SERPL-MCNC: 68 MG/DL (ref 37–140)
VLDLC SERPL CALC-MCNC: 14 MG/DL
WBC # SPEC AUTO: 6.8 X10(3)/MCL (ref 4.5–11.5)

## 2022-11-10 PROCEDURE — 86803 HEPATITIS C AB TEST: CPT

## 2022-11-10 PROCEDURE — 85025 COMPLETE CBC W/AUTO DIFF WBC: CPT

## 2022-11-10 PROCEDURE — 80061 LIPID PANEL: CPT

## 2022-11-10 PROCEDURE — 80053 COMPREHEN METABOLIC PANEL: CPT

## 2022-11-10 PROCEDURE — 36415 COLL VENOUS BLD VENIPUNCTURE: CPT

## 2022-11-11 ENCOUNTER — DOCUMENTATION ONLY (OUTPATIENT)
Dept: INTERNAL MEDICINE | Facility: CLINIC | Age: 72
End: 2022-11-11
Payer: MEDICARE

## 2022-11-14 ENCOUNTER — OFFICE VISIT (OUTPATIENT)
Dept: INTERNAL MEDICINE | Facility: CLINIC | Age: 72
End: 2022-11-14
Payer: MEDICARE

## 2022-11-14 VITALS
SYSTOLIC BLOOD PRESSURE: 132 MMHG | BODY MASS INDEX: 30.61 KG/M2 | WEIGHT: 195 LBS | OXYGEN SATURATION: 99 % | HEART RATE: 66 BPM | RESPIRATION RATE: 20 BRPM | DIASTOLIC BLOOD PRESSURE: 68 MMHG | HEIGHT: 67 IN

## 2022-11-14 DIAGNOSIS — H90.3 SENSORINEURAL HEARING LOSS (SNHL) OF BOTH EARS: ICD-10-CM

## 2022-11-14 DIAGNOSIS — E78.5 HYPERLIPIDEMIA, UNSPECIFIED HYPERLIPIDEMIA TYPE: ICD-10-CM

## 2022-11-14 DIAGNOSIS — G47.33 OSA ON CPAP: ICD-10-CM

## 2022-11-14 DIAGNOSIS — M85.80 OSTEOPENIA, UNSPECIFIED LOCATION: ICD-10-CM

## 2022-11-14 DIAGNOSIS — F33.1 MODERATE EPISODE OF RECURRENT MAJOR DEPRESSIVE DISORDER: ICD-10-CM

## 2022-11-14 DIAGNOSIS — K21.9 GASTROESOPHAGEAL REFLUX DISEASE WITHOUT ESOPHAGITIS: ICD-10-CM

## 2022-11-14 DIAGNOSIS — N39.46 MIXED STRESS AND URGE URINARY INCONTINENCE: ICD-10-CM

## 2022-11-14 DIAGNOSIS — H57.10 PAIN IN EYE, UNSPECIFIED LATERALITY: Primary | ICD-10-CM

## 2022-11-14 DIAGNOSIS — Z86.000 HISTORY OF DUCTAL CARCINOMA IN SITU (DCIS) OF BREAST: ICD-10-CM

## 2022-11-14 DIAGNOSIS — H57.11 PAIN OF RIGHT EYE: ICD-10-CM

## 2022-11-14 DIAGNOSIS — Z00.00 ENCOUNTER FOR MEDICARE ANNUAL WELLNESS EXAM: ICD-10-CM

## 2022-11-14 PROBLEM — I10 PRIMARY HYPERTENSION: Status: ACTIVE | Noted: 2022-03-29

## 2022-11-14 PROBLEM — M81.0 OSTEOPOROSIS: Status: RESOLVED | Noted: 2022-11-14 | Resolved: 2022-11-14

## 2022-11-14 PROBLEM — I10 PRIMARY HYPERTENSION: Status: RESOLVED | Noted: 2022-03-29 | Resolved: 2022-11-14

## 2022-11-14 PROBLEM — F32.A DEPRESSION: Status: ACTIVE | Noted: 2022-11-14

## 2022-11-14 PROBLEM — H93.13 TINNITUS OF BOTH EARS: Status: ACTIVE | Noted: 2019-09-24

## 2022-11-14 PROBLEM — M19.042 OSTEOARTHRITIS OF LEFT HAND: Status: ACTIVE | Noted: 2022-11-14

## 2022-11-14 PROBLEM — R32 URINARY INCONTINENCE: Status: ACTIVE | Noted: 2022-11-14

## 2022-11-14 PROBLEM — M81.0 OSTEOPOROSIS: Status: ACTIVE | Noted: 2022-11-14

## 2022-11-14 PROBLEM — B02.9 HERPES ZOSTER: Status: ACTIVE | Noted: 2022-11-14

## 2022-11-14 PROBLEM — B02.9 HERPES ZOSTER: Status: RESOLVED | Noted: 2022-11-14 | Resolved: 2022-11-14

## 2022-11-14 PROBLEM — K22.4 DIFFUSE SPASM OF ESOPHAGUS: Status: ACTIVE | Noted: 2022-11-14

## 2022-11-14 PROBLEM — G56.20 ULNAR NEURITIS: Status: ACTIVE | Noted: 2022-11-14

## 2022-11-14 PROCEDURE — 3078F PR MOST RECENT DIASTOLIC BLOOD PRESSURE < 80 MM HG: ICD-10-PCS | Mod: CPTII,,, | Performed by: INTERNAL MEDICINE

## 2022-11-14 PROCEDURE — 1160F RVW MEDS BY RX/DR IN RCRD: CPT | Mod: CPTII,,, | Performed by: INTERNAL MEDICINE

## 2022-11-14 PROCEDURE — 3075F PR MOST RECENT SYSTOLIC BLOOD PRESS GE 130-139MM HG: ICD-10-PCS | Mod: CPTII,,, | Performed by: INTERNAL MEDICINE

## 2022-11-14 PROCEDURE — G0439 PR MEDICARE ANNUAL WELLNESS SUBSEQUENT VISIT: ICD-10-PCS | Mod: ,,, | Performed by: INTERNAL MEDICINE

## 2022-11-14 PROCEDURE — 3078F DIAST BP <80 MM HG: CPT | Mod: CPTII,,, | Performed by: INTERNAL MEDICINE

## 2022-11-14 PROCEDURE — 1159F PR MEDICATION LIST DOCUMENTED IN MEDICAL RECORD: ICD-10-PCS | Mod: CPTII,,, | Performed by: INTERNAL MEDICINE

## 2022-11-14 PROCEDURE — 1126F AMNT PAIN NOTED NONE PRSNT: CPT | Mod: CPTII,,, | Performed by: INTERNAL MEDICINE

## 2022-11-14 PROCEDURE — 1126F PR PAIN SEVERITY QUANTIFIED, NO PAIN PRESENT: ICD-10-PCS | Mod: CPTII,,, | Performed by: INTERNAL MEDICINE

## 2022-11-14 PROCEDURE — 3288F PR FALLS RISK ASSESSMENT DOCUMENTED: ICD-10-PCS | Mod: CPTII,,, | Performed by: INTERNAL MEDICINE

## 2022-11-14 PROCEDURE — 3008F BODY MASS INDEX DOCD: CPT | Mod: CPTII,,, | Performed by: INTERNAL MEDICINE

## 2022-11-14 PROCEDURE — 1101F PR PT FALLS ASSESS DOC 0-1 FALLS W/OUT INJ PAST YR: ICD-10-PCS | Mod: CPTII,,, | Performed by: INTERNAL MEDICINE

## 2022-11-14 PROCEDURE — 3008F PR BODY MASS INDEX (BMI) DOCUMENTED: ICD-10-PCS | Mod: CPTII,,, | Performed by: INTERNAL MEDICINE

## 2022-11-14 PROCEDURE — 1101F PT FALLS ASSESS-DOCD LE1/YR: CPT | Mod: CPTII,,, | Performed by: INTERNAL MEDICINE

## 2022-11-14 PROCEDURE — 3075F SYST BP GE 130 - 139MM HG: CPT | Mod: CPTII,,, | Performed by: INTERNAL MEDICINE

## 2022-11-14 PROCEDURE — 1159F MED LIST DOCD IN RCRD: CPT | Mod: CPTII,,, | Performed by: INTERNAL MEDICINE

## 2022-11-14 PROCEDURE — G0439 PPPS, SUBSEQ VISIT: HCPCS | Mod: ,,, | Performed by: INTERNAL MEDICINE

## 2022-11-14 PROCEDURE — 3288F FALL RISK ASSESSMENT DOCD: CPT | Mod: CPTII,,, | Performed by: INTERNAL MEDICINE

## 2022-11-14 PROCEDURE — 1160F PR REVIEW ALL MEDS BY PRESCRIBER/CLIN PHARMACIST DOCUMENTED: ICD-10-PCS | Mod: CPTII,,, | Performed by: INTERNAL MEDICINE

## 2022-11-14 RX ORDER — MIRABEGRON 50 MG/1
50 TABLET, FILM COATED, EXTENDED RELEASE ORAL DAILY
Qty: 30 TABLET | Refills: 11 | Status: SHIPPED | OUTPATIENT
Start: 2022-11-14 | End: 2023-12-20

## 2022-11-14 RX ORDER — MULTIVITAMIN
1 TABLET ORAL DAILY
COMMUNITY

## 2022-11-14 RX ORDER — ACETAMINOPHEN 500 MG
5000 TABLET ORAL
COMMUNITY

## 2022-11-14 NOTE — ASSESSMENT & PLAN NOTE
She wants to try the myrbetriq again.  If that doesn't work, I rec she see the urologist.  I also recommended avoiding bladder irritants, doing kegels.

## 2022-11-14 NOTE — PROGRESS NOTES
Subjective:        Patient Care Team:  Caitlyn Colon MD as PCP - General (Internal Medicine)     Chief Complaint: Medicare AWV (Discuss labs /Wants to talk about overactive bladder meds /C/o stabbing pain in R eye X1mo- has not seen eye dr in a long time )      HPI: She is here for a medicare wellness.  She has hearing aids now.  And she is using a cpap.  She c/o urinary incontinence.  She tried myrbetriq, but she found it to be ineffective.      She still has balance issues.  She cruises the furniure to prevent from falling.  Last month, she developed a severe right eye pain under her eye.  The pain is intermittent.  Occ the pain is when she moves her up lateral and down.  The left eye doesn't bother her.  She has a h/o blepharoplasty.  She feels the orbit is what is hurting.    She c/o some persistent depressive symptoms and anhedonia.  She has some external stressors.  Denies SI.    Problem Noted   Diffuse Spasm of Esophagus 11/14/2022   Osteoarthritis of Left Hand 11/14/2022   Ulnar Neuritis 11/14/2022   Encounter for Medicare Annual Wellness Exam 11/14/2022   Urinary Incontinence 11/14/2022   History of Ductal Carcinoma in Situ (Dcis) of Breast 11/14/2022   Depression 11/14/2022   Sensorineural Hearing Loss (Snhl) of Both Ears 9/24/2019    She sees [facility] -- Anahi Nguyen     Tinnitus of Both Ears 9/24/2019   Syd On Cpap 12/7/2013    She is followed by [facility]     Gerd (Gastroesophageal Reflux Disease) 12/7/2013   Osteopenia 12/7/2013    Her last BMD in 5/21 showed only osteopenia.  She is on Evista.     Osteoporosis (Resolved) 11/14/2022   Herpes Zoster (Resolved) 11/14/2022   Primary Hypertension (Resolved) 3/29/2022   Ductal Carcinoma in Situ of Breast (Resolved) 12/11/2013    right breast          The patient's Health Maintenance was reviewed and the following appears to be due:   Health Maintenance Due   Topic Date Due    Shingles Vaccine (1 of 2) Never done    Pneumococcal Vaccines  (Age 65+) (2 - PCV) 2012    COVID-19 Vaccine (3 - Booster for Pfizer series) 10/13/2021    Influenza Vaccine (1) Never done       Past Medical History:  Past Medical History:   Diagnosis Date    Ductal carcinoma in situ of breast 2013    right breast    Herpes zoster 2022    DARI (obstructive sleep apnea)     DARI (obstructive sleep apnea)     uses CPAP    Osteoporosis 2022    Personal history of colonic polyps 2020    Primary hypertension 3/29/2022    Shingles      Past Surgical History:   Procedure Laterality Date    COLONOSCOPY W/ POLYPECTOMY  2020    MASTECTOMY Bilateral      Review of patient's allergies indicates:  No Known Allergies  Current Outpatient Medications on File Prior to Visit   Medication Sig Dispense Refill    cholecalciferol, vitamin D3, 125 mcg (5,000 unit) Tab Take 5,000 Units by mouth.      EScitalopram oxalate (LEXAPRO) 10 MG tablet TAKE 1 TABLET BY MOUTH ONCE DAILY 90 tablet 1    fluticasone propionate (FLONASE) 50 mcg/actuation nasal spray by Each Nostril route.      GINKGO BILOBA ORAL ginkgo biloba Take No date recorded No form recorded No frequency recorded No route recorded No set duration recorded No set duration amount recorded suspended No dosage strength recorded No dosage strength units of measure recorded      levocetirizine (XYZAL) 5 MG tablet SMARTSI Tablet(s) By Mouth Every Evening Strength: 5 mg 90 tablet 3    multivitamin (THERAGRAN) per tablet Take 1 tablet by mouth once daily.      raloxifene (EVISTA) 60 mg tablet TAKE ONE TABLET BY MOUTH EVERY DAY 90 tablet 3     No current facility-administered medications on file prior to visit.     Social History     Socioeconomic History    Marital status:    Tobacco Use    Smoking status: Never    Smokeless tobacco: Never   Substance and Sexual Activity    Alcohol use: Yes     Alcohol/week: 12.0 standard drinks     Types: 10 Glasses of wine, 2 Cans of beer per week    Drug use: Never     Sexual activity: Not Currently     Birth control/protection: Post-menopausal   Social History Narrative    ** Merged History Encounter **          Family History   Problem Relation Age of Onset    Alzheimer's disease Mother        Review of Systems    Patient Reported Health Risk Assessment  What [age] 70-79  Are you male or female?: Female  During the past four weeks, how much have you been bothered by emotional problems such as feeling anxious, depressed, irritable, sad, or downhearted and blue?: Not at all  During the past five weeks, has your physical and/or emotional health limited your social activities with family, friends, neighbors, or groups?: Not at all  During the past four weeks, how much bodily pain have you generally had?: Mild pain  During the past four weeks, was someone available to help if you needed and wanted help?: Yes, as much as I wanted  During the past four weeks, what was the hardest physical activity you could do for at least two minutes?: Moderate  Can you get to places out of walking distance without help?  (For example, can you travel alone on buses or taxis, or drive your own car?): Yes  Can you go shopping for groceries or clothes without someone's help?: Yes  Can you prepare your own meals?: Yes  Can you do your own housework without help?: Yes  Because of any health problems, do you need the help of another person with your personal care needs such as eating, bathing, dressing, or getting around the house?: No  Can you handle your own money without help?: Yes  During the past four weeks, how would you rate your health in general?: Good  How have things been going for you during the past four weeks?: Very well  Are you having difficulties driving your car?: No  Do you always fasten your seat belt when you are in a car?: Yes, usually  How often in the past four weeks have you been bothered by falling or dizzy when standing up?: Never  How often in the past four weeks have you  "been bothered by sexual problems?: Never  How often in the past four weeks have you been bothered by trouble eating well?: Never  How often in the past four weeks have you been bothered by teeth or denture problems?: Never  How often in the past four weeks have you been bothered with problems using the telephone?: Never  How often in the past four weeks have you been bothered by tiredness or fatigue?: Never  Have you fallen two or more times in the past year?: No  Are you afraid of falling?: No  Are you a smoker?: No  During the past four weeks, how many drinks of wine, beer, or other alcoholic beverages did you have?: 6-9 drinks per week  Do you exercise for about 20 minutes three or more days a week?: No, I usually do not exercise this much  Have you been given any information to help you with hazards in your house that might hurt you?: Yes  Have you been given any information to help you with keeping track of your medications?: Yes  How often do you have trouble taking medicines the way you've been told to take them?: I always take them as prescribed  How confident are you that you can control and manage most of your health problems?: Very confident  What is your race? (Check all that apply.):     Opioid Screening: Patient medication list reviewed, patient is not taking prescription opioids. Patient is not using additional opioids than prescribed. Patient is at low risk of substance abuse based on this opioid use history.     Objective:   /68 (BP Location: Left arm, Patient Position: Sitting, BP Method: Small (Manual))   Pulse 66   Resp 20   Ht 5' 6.93" (1.7 m)   Wt 88.5 kg (195 lb)   SpO2 99%   BMI 30.61 kg/m²     Physical Exam  Constitutional:       General: She is not in acute distress.     Appearance: Normal appearance.   HENT:      Head: Normocephalic and atraumatic.   Eyes:      General: No scleral icterus.     Conjunctiva/sclera: Conjunctivae normal.   Neck:      Vascular: No carotid " bruit.   Cardiovascular:      Rate and Rhythm: Normal rate and regular rhythm.      Pulses: Normal pulses.      Heart sounds: Normal heart sounds. No murmur heard.    No friction rub. No gallop.   Pulmonary:      Effort: Pulmonary effort is normal.      Breath sounds: Normal breath sounds.   Abdominal:      General: Bowel sounds are normal.      Palpations: Abdomen is soft. There is no mass.      Tenderness: There is no abdominal tenderness. There is no guarding or rebound.   Musculoskeletal:         General: No deformity.      Cervical back: No rigidity or tenderness.      Right lower leg: No edema.      Left lower leg: No edema.   Lymphadenopathy:      Cervical: No cervical adenopathy.   Skin:     Coloration: Skin is not jaundiced or pale.      Findings: No erythema.   Neurological:      General: No focal deficit present.      Mental Status: She is alert and oriented to person, place, and time.      Gait: Gait normal.   Psychiatric:         Mood and Affect: Mood normal.         Behavior: Behavior normal.         Thought Content: Thought content normal.         Judgment: Judgment normal.         No flowsheet data found.  Fall Risk Assessment - Outpatient 11/14/2022   Mobility Status Ambulatory   Number of falls 0   Identified as fall risk 0           Depression Screening  Over the past two weeks, has the patient felt down, depressed, or hopeless?: No  Over the past two weeks, has the patient felt little interest or pleasure in doing things?: No  Functional Ability/Safety Screening  Was the patient's timed Up & Go test unsteady or longer than 30 seconds?: No  Does the patient need help with phone, transportation, shopping, preparing meals, housework, laundry, meds, or managing money?: No  Does the patient's home have rugs in the hallway, lack grab bars in the bathroom, lack handrails on the stairs or have poor lighting?: No  Have you noticed any hearing difficulties?: No  Cognitive Function (Assessed through direct  observation with due consideration of information obtained by way of patient reports and/or concerns raised by family, friends, caretakers, or others)    Does the patient repeat questions/statements in the same day?: No  Does the patient have trouble remembering the date, year, and time?: No  Does the patient have difficulty managing finances?: No  Does the patient have a decreased sense of direction?: No    Assessment and Plan:     Encounter for Medicare annual wellness exam  Health Maintenance Due   Topic Date Due    Mammogram  Never done    Shingles Vaccine (1 of 2) Never done    Pneumococcal Vaccines (Age 65+) (2 - PCV) 01/01/2012    COVID-19 Vaccine (3 - Booster for Pfizer series) 10/13/2021    Influenza Vaccine (1) Never done     The 10-year ASCVD risk score (Issac RIOS, et al., 2019) is: 15.1%    Values used to calculate the score:      Age: 72 years      Sex: Female      Is Non- : No      Diabetic: No      Tobacco smoker: No      Systolic Blood Pressure: 132 mmHg      Is BP treated: No      HDL Cholesterol: 46 mg/dL      Total Cholesterol: 171 mg/dL    She elects not to take a statin.    Recent labs reviewed and discussed.  She doesn't need a mmg because of bilateral mastectomy.    Urinary incontinence  She wants to try the myrbetriq again.  If that doesn't work, I rec she see the urologist.  I also recommended avoiding bladder irritants, doing kegels.    Depression  We discussed increasing her dose.  She wants to continue where she is.     Follow up in about 1 year (around 11/14/2023).    Medications Ordered This Encounter   Medications    mirabegron (MYRBETRIQ) 50 mg Tb24     Sig: Take 1 tablet (50 mg total) by mouth once daily.     Dispense:  30 tablet     Refill:  11     [unfilled]  Orders Placed This Encounter   Procedures    Lipid Panel     Standing Status:   Future     Standing Expiration Date:   1/13/2024    Comprehensive Metabolic Panel     Standing Status:   Future      Standing Expiration Date:   1/13/2024    CBC Auto Differential     Standing Status:   Future     Standing Expiration Date:   1/13/2024    Ambulatory referral/consult to Ophthalmology     Standing Status:   Future     Standing Expiration Date:   12/14/2023     Referral Priority:   Routine     Referral Type:   Consultation     Referral Reason:   Specialty Services Required     Referred to Provider:   Mame Melo MD     Requested Specialty:   Ophthalmology     Number of Visits Requested:   1         Medicare Annual Wellness and Personalized Prevention Plan:   Fall Risk + Home Safety + Hearing Impairment + Depression Screen + Cognitive Impairment Screen + Health Risk Assessment all reviewed    Advance Care Planning   I attest to discussing Advance Care Planning with patient and/or family member.  Education was provided including the importance of the Health Care Power of , Advance Directives, and/or [place] documentation.  The patient expressed understanding to the importance of this information and discussion.       Follow up in about 1 year (around 11/14/2023). In addition to their scheduled follow up, the patient has also been instructed to follow up on as needed basis.

## 2022-11-14 NOTE — ASSESSMENT & PLAN NOTE
Health Maintenance Due   Topic Date Due    Mammogram  Never done    Shingles Vaccine (1 of 2) Never done    Pneumococcal Vaccines (Age 65+) (2 - PCV) 01/01/2012    COVID-19 Vaccine (3 - Booster for Pfizer series) 10/13/2021    Influenza Vaccine (1) Never done     The 10-year ASCVD risk score (Issac RIOS, et al., 2019) is: 15.1%    Values used to calculate the score:      Age: 72 years      Sex: Female      Is Non- : No      Diabetic: No      Tobacco smoker: No      Systolic Blood Pressure: 132 mmHg      Is BP treated: No      HDL Cholesterol: 46 mg/dL      Total Cholesterol: 171 mg/dL    She elects not to take a statin.    Recent labs reviewed and discussed.  She doesn't need a mmg because of bilateral mastectomy.

## 2023-01-17 ENCOUNTER — OFFICE VISIT (OUTPATIENT)
Dept: INTERNAL MEDICINE | Facility: CLINIC | Age: 73
End: 2023-01-17
Payer: MEDICARE

## 2023-01-17 VITALS
BODY MASS INDEX: 30.61 KG/M2 | OXYGEN SATURATION: 95 % | HEIGHT: 67 IN | SYSTOLIC BLOOD PRESSURE: 138 MMHG | RESPIRATION RATE: 14 BRPM | WEIGHT: 195 LBS | DIASTOLIC BLOOD PRESSURE: 70 MMHG | HEART RATE: 75 BPM

## 2023-01-17 DIAGNOSIS — H26.9 CATARACT, UNSPECIFIED CATARACT TYPE, UNSPECIFIED LATERALITY: ICD-10-CM

## 2023-01-17 DIAGNOSIS — Z01.818 PRE-OP EXAM: Primary | ICD-10-CM

## 2023-01-17 LAB
ANION GAP SERPL CALC-SCNC: 8 MEQ/L
BASOPHILS # BLD AUTO: 0.03 X10(3)/MCL (ref 0–0.2)
BASOPHILS NFR BLD AUTO: 0.4 %
BUN SERPL-MCNC: 8 MG/DL (ref 9.8–20.1)
CALCIUM SERPL-MCNC: 9.5 MG/DL (ref 8.4–10.2)
CHLORIDE SERPL-SCNC: 106 MMOL/L (ref 98–107)
CO2 SERPL-SCNC: 25 MMOL/L (ref 23–31)
CREAT SERPL-MCNC: 0.8 MG/DL (ref 0.55–1.02)
CREAT/UREA NIT SERPL: 10
EOSINOPHIL # BLD AUTO: 0.14 X10(3)/MCL (ref 0–0.9)
EOSINOPHIL NFR BLD AUTO: 1.9 %
ERYTHROCYTE [DISTWIDTH] IN BLOOD BY AUTOMATED COUNT: 12.5 % (ref 11.5–17)
GFR SERPLBLD CREATININE-BSD FMLA CKD-EPI: >60 MLS/MIN/1.73/M2
GLUCOSE SERPL-MCNC: 92 MG/DL (ref 82–115)
HCT VFR BLD AUTO: 42.7 % (ref 37–47)
HGB BLD-MCNC: 13.9 GM/DL (ref 12–16)
IMM GRANULOCYTES # BLD AUTO: 0.02 X10(3)/MCL (ref 0–0.04)
IMM GRANULOCYTES NFR BLD AUTO: 0.3 %
LYMPHOCYTES # BLD AUTO: 2.56 X10(3)/MCL (ref 0.6–4.6)
LYMPHOCYTES NFR BLD AUTO: 35.6 %
MCH RBC QN AUTO: 30.7 PG
MCHC RBC AUTO-ENTMCNC: 32.6 MG/DL (ref 33–36)
MCV RBC AUTO: 94.3 FL (ref 80–94)
MONOCYTES # BLD AUTO: 0.82 X10(3)/MCL (ref 0.1–1.3)
MONOCYTES NFR BLD AUTO: 11.4 %
NEUTROPHILS # BLD AUTO: 3.62 X10(3)/MCL (ref 2.1–9.2)
NEUTROPHILS NFR BLD AUTO: 50.4 %
NRBC BLD AUTO-RTO: 0 %
PLATELET # BLD AUTO: 368 X10(3)/MCL (ref 130–400)
PMV BLD AUTO: 9.4 FL (ref 7.4–10.4)
POTASSIUM SERPL-SCNC: 4.2 MMOL/L (ref 3.5–5.1)
RBC # BLD AUTO: 4.53 X10(6)/MCL (ref 4.2–5.4)
SODIUM SERPL-SCNC: 139 MMOL/L (ref 136–145)
WBC # SPEC AUTO: 7.2 X10(3)/MCL (ref 4.5–11.5)

## 2023-01-17 PROCEDURE — 85025 COMPLETE CBC W/AUTO DIFF WBC: CPT | Performed by: NURSE PRACTITIONER

## 2023-01-17 PROCEDURE — 1159F MED LIST DOCD IN RCRD: CPT | Mod: CPTII,,, | Performed by: NURSE PRACTITIONER

## 2023-01-17 PROCEDURE — 1160F PR REVIEW ALL MEDS BY PRESCRIBER/CLIN PHARMACIST DOCUMENTED: ICD-10-PCS | Mod: CPTII,,, | Performed by: NURSE PRACTITIONER

## 2023-01-17 PROCEDURE — 99214 OFFICE O/P EST MOD 30 MIN: CPT | Mod: ,,, | Performed by: NURSE PRACTITIONER

## 2023-01-17 PROCEDURE — 3008F PR BODY MASS INDEX (BMI) DOCUMENTED: ICD-10-PCS | Mod: CPTII,,, | Performed by: NURSE PRACTITIONER

## 2023-01-17 PROCEDURE — 3078F PR MOST RECENT DIASTOLIC BLOOD PRESSURE < 80 MM HG: ICD-10-PCS | Mod: CPTII,,, | Performed by: NURSE PRACTITIONER

## 2023-01-17 PROCEDURE — 3075F PR MOST RECENT SYSTOLIC BLOOD PRESS GE 130-139MM HG: ICD-10-PCS | Mod: CPTII,,, | Performed by: NURSE PRACTITIONER

## 2023-01-17 PROCEDURE — 36415 COLL VENOUS BLD VENIPUNCTURE: CPT | Performed by: NURSE PRACTITIONER

## 2023-01-17 PROCEDURE — 3288F FALL RISK ASSESSMENT DOCD: CPT | Mod: CPTII,,, | Performed by: NURSE PRACTITIONER

## 2023-01-17 PROCEDURE — 1126F AMNT PAIN NOTED NONE PRSNT: CPT | Mod: CPTII,,, | Performed by: NURSE PRACTITIONER

## 2023-01-17 PROCEDURE — 99214 PR OFFICE/OUTPT VISIT, EST, LEVL IV, 30-39 MIN: ICD-10-PCS | Mod: ,,, | Performed by: NURSE PRACTITIONER

## 2023-01-17 PROCEDURE — 1159F PR MEDICATION LIST DOCUMENTED IN MEDICAL RECORD: ICD-10-PCS | Mod: CPTII,,, | Performed by: NURSE PRACTITIONER

## 2023-01-17 PROCEDURE — 3075F SYST BP GE 130 - 139MM HG: CPT | Mod: CPTII,,, | Performed by: NURSE PRACTITIONER

## 2023-01-17 PROCEDURE — 1101F PT FALLS ASSESS-DOCD LE1/YR: CPT | Mod: CPTII,,, | Performed by: NURSE PRACTITIONER

## 2023-01-17 PROCEDURE — 1160F RVW MEDS BY RX/DR IN RCRD: CPT | Mod: CPTII,,, | Performed by: NURSE PRACTITIONER

## 2023-01-17 PROCEDURE — 3008F BODY MASS INDEX DOCD: CPT | Mod: CPTII,,, | Performed by: NURSE PRACTITIONER

## 2023-01-17 PROCEDURE — 3078F DIAST BP <80 MM HG: CPT | Mod: CPTII,,, | Performed by: NURSE PRACTITIONER

## 2023-01-17 PROCEDURE — 3288F PR FALLS RISK ASSESSMENT DOCUMENTED: ICD-10-PCS | Mod: CPTII,,, | Performed by: NURSE PRACTITIONER

## 2023-01-17 PROCEDURE — 80048 BASIC METABOLIC PNL TOTAL CA: CPT | Performed by: NURSE PRACTITIONER

## 2023-01-17 PROCEDURE — 1126F PR PAIN SEVERITY QUANTIFIED, NO PAIN PRESENT: ICD-10-PCS | Mod: CPTII,,, | Performed by: NURSE PRACTITIONER

## 2023-01-17 PROCEDURE — 1101F PR PT FALLS ASSESS DOC 0-1 FALLS W/OUT INJ PAST YR: ICD-10-PCS | Mod: CPTII,,, | Performed by: NURSE PRACTITIONER

## 2023-01-17 RX ORDER — MOXIFLOXACIN 5 MG/ML
1 SOLUTION/ DROPS OPHTHALMIC 4 TIMES DAILY
COMMUNITY
Start: 2022-12-26 | End: 2023-10-26

## 2023-01-17 RX ORDER — PREDNISOLONE ACETATE 10 MG/ML
SUSPENSION/ DROPS OPHTHALMIC
COMMUNITY
Start: 2022-12-26 | End: 2023-10-26

## 2023-01-17 NOTE — PROGRESS NOTES
Patient ID: [id number]     Chief Complaint: Pre-op Exam (Pt is here for Sx clearance for cataract left eye and having on  and the right eye . Pt is doing good no health problems. )        HPI:     Sanjana Beyer is a 72 y.o. female here today for a surgery clearance. Plans to have L cataract sx 23 and R eye on 23 per Dr. Chambers. No other complaints today. Denies CP, palpitations, Sob, s/s bleeding/bruising, fever, chills, or sweats. No hx or family h/o anesthesia problems. Overall, she feels well.         ----------------------------  Ductal carcinoma in situ of breast      Comment:  right breast  Herpes zoster  DARI (obstructive sleep apnea)  DARI (obstructive sleep apnea)      Comment:  uses CPAP  Osteoporosis  Personal history of colonic polyps  Primary hypertension  Shingles     Past Surgical History:   Procedure Laterality Date    COLONOSCOPY W/ POLYPECTOMY  2020    MASTECTOMY Bilateral        Review of patient's allergies indicates:  No Known Allergies    Outpatient Medications Marked as Taking for the 23 encounter (Office Visit) with Valery Espinoza NP   Medication Sig Dispense Refill    cholecalciferol, vitamin D3, 125 mcg (5,000 unit) Tab Take 5,000 Units by mouth.      EScitalopram oxalate (LEXAPRO) 10 MG tablet TAKE 1 TABLET BY MOUTH ONCE DAILY 90 tablet 1    fluticasone propionate (FLONASE) 50 mcg/actuation nasal spray by Each Nostril route.      GINKGO BILOBA ORAL ginkgo biloba Take No date recorded No form recorded No frequency recorded No route recorded No set duration recorded No set duration amount recorded suspended No dosage strength recorded No dosage strength units of measure recorded      levocetirizine (XYZAL) 5 MG tablet SMARTSI Tablet(s) By Mouth Every Evening Strength: 5 mg 90 tablet 3    mirabegron (MYRBETRIQ) 50 mg Tb24 Take 1 tablet (50 mg total) by mouth once daily. 30 tablet 11    moxifloxacin (VIGAMOX) 0.5 % ophthalmic solution Place 1 drop into both  eyes 4 (four) times daily.      multivitamin (THERAGRAN) per tablet Take 1 tablet by mouth once daily.      prednisoLONE acetate (PRED FORTE) 1 % DrpS SMARTSI In Eye(s) 4 Times Daily      raloxifene (EVISTA) 60 mg tablet TAKE ONE TABLET BY MOUTH EVERY DAY 90 tablet 3       Social History     Socioeconomic History    Marital status:    Tobacco Use    Smoking status: Never    Smokeless tobacco: Never   Substance and Sexual Activity    Alcohol use: Yes     Alcohol/week: 12.0 standard drinks     Types: 10 Glasses of wine, 2 Cans of beer per week    Drug use: Never    Sexual activity: Not Currently     Birth control/protection: Post-menopausal   Social History Narrative    ** Merged History Encounter **             Family History   Problem Relation Age of Onset    Alzheimer's disease Mother         Patient Care Team:  Caitlyn Colon MD as PCP - General (Internal Medicine)     Subjective:     ROS    See HPI for details    Constitutional: Denies Change in appetite. Denies Chills. Denies Fever. Denies Night sweats.  Eye: Denies Blurred vision. Denies Discharge. Denies Eye pain.  ENT: Denies Decreased hearing. Denies Sore throat. Denies Swollen glands.  Respiratory: Denies Cough. Denies Shortness of breath. Denies Shortness of breath with exertion. Denies Wheezing.  Cardiovascular: DeniesChest pain at rest. Denies Chest pain with exertion. Denies Irregular heartbeat. Denies Palpitations.  Gastrointestinal: Denies Abdominal pain. DeniesDiarrhea. Denies Nausea. Denies Vomiting. Denies Hematemesis or Hematochezia.  Genitourinary: Denies Dysuria. Denies Urinary frequency. Denies Urinary urgency. Denies Blood in urine.  Endocrine: Denies Cold intolerance. Denies Excessive thirst. Denies Heat intolerance. Denies Weight loss. Denies Weight gain.  Musculoskeletal: Denies Painful joints. Denies Weakness.  Integumentary: Denies Rash. Denies Itching. Denies Dry skin.  Neurologic: Denies Dizziness. Denies Fainting. Denies  "Headache.  Psychiatric: Denies Depression. Denies Anxiety. Denies Suicidal/Homicidal ideations.    All Other ROS: Negative except as stated in HPI.       Objective:     /70 (BP Location: Left arm, Patient Position: Sitting, BP Method: Medium (Manual))   Pulse 75   Resp 14   Ht 5' 6.93" (1.7 m)   Wt 88.5 kg (195 lb)   SpO2 95%   BMI 30.61 kg/m²     Physical Exam    General: Alert and oriented, No acute distress.  Head: Normocephalic, Atraumatic.  Eye: Pupils are equal, round and reactive to light, Extraocular movements are intact, Sclera non-icteric.  Ears/Nose/Throat: Normal, Mucosa moist,Clear.  Neck/Thyroid: Supple, Non-tender, No carotid bruit, No palpable thyromegaly or thyroid nodule, No lymphadenopathy, No JVD, Full range of motion.  Respiratory: Clear to auscultation bilaterally; No wheezes, rales or rhonchi,Non-labored respirations, Symmetrical chest wall expansion.  Cardiovascular: Regular rate and rhythm, S1/S2 normal, No murmurs, rubs or gallops.  Gastrointestinal: Soft, Non-tender, Non-distended, Normal bowel sounds, No palpable organomegaly.  Musculoskeletal: Normal range of motion.  Integumentary: Warm, Dry, Intact, No suspicious lesions or rashes.  Extremities: No clubbing, cyanosis or edema  Neurologic: No focal deficits, Cranial Nerves II-XII are grossly intact, Motor strength normal upper and lower extremities, Sensory exam intact.  Psychiatric: Normal interaction, Coherent speech, Euthymic mood, Appropriate affect       The 10-year ASCVD risk score (Issaquah DK, et al., 2019) is: 16.3%    Values used to calculate the score:      Age: 72 years      Sex: Female      Is Non- : No      Diabetic: No      Tobacco smoker: No      Systolic Blood Pressure: 138 mmHg      Is BP treated: No      HDL Cholesterol: 46 mg/dL      Total Cholesterol: 171 mg/dL      Assessment:       ICD-10-CM ICD-9-CM   1. Pre-op exam  Z01.818 V72.84   2. Cataract, unspecified cataract type, " unspecified laterality  H26.9 366.9        Plan:     1. Pre-op exam    2. Cataract, unspecified cataract type, unspecified laterality      The patient is cleared for the planned procedure as scheduled as all of their chronic conditions are optimally controlled.         Medication List with Changes/Refills   Current Medications    CHOLECALCIFEROL, VITAMIN D3, 125 MCG (5,000 UNIT) TAB    Take 5,000 Units by mouth.       Start Date: --        End Date: --    ESCITALOPRAM OXALATE (LEXAPRO) 10 MG TABLET    TAKE 1 TABLET BY MOUTH ONCE DAILY       Start Date: 2022 End Date: --    FLUTICASONE PROPIONATE (FLONASE) 50 MCG/ACTUATION NASAL SPRAY    by Each Nostril route.       Start Date: 3/29/2022 End Date: --    GINKGO BILOBA ORAL    ginkgo biloba Take No date recorded No form recorded No frequency recorded No route recorded No set duration recorded No set duration amount recorded suspended No dosage strength recorded No dosage strength units of measure recorded       Start Date: --        End Date: --    LEVOCETIRIZINE (XYZAL) 5 MG TABLET    SMARTSI Tablet(s) By Mouth Every Evening Strength: 5 mg       Start Date: 2022 End Date: --    MIRABEGRON (MYRBETRIQ) 50 MG TB24    Take 1 tablet (50 mg total) by mouth once daily.       Start Date: 2022End Date: 2023    MOXIFLOXACIN (VIGAMOX) 0.5 % OPHTHALMIC SOLUTION    Place 1 drop into both eyes 4 (four) times daily.       Start Date: 2022End Date: --    MULTIVITAMIN (THERAGRAN) PER TABLET    Take 1 tablet by mouth once daily.       Start Date: --        End Date: --    PREDNISOLONE ACETATE (PRED FORTE) 1 % DRPS    SMARTSI In Eye(s) 4 Times Daily       Start Date: 2022End Date: --    RALOXIFENE (EVISTA) 60 MG TABLET    TAKE ONE TABLET BY MOUTH EVERY DAY       Start Date: 2022  End Date: --          No follow-ups on file.  In addition to their scheduled follow up, the patient has also been instructed to follow up on as needed basis.

## 2023-01-18 ENCOUNTER — TELEPHONE (OUTPATIENT)
Dept: INTERNAL MEDICINE | Facility: CLINIC | Age: 73
End: 2023-01-18
Payer: MEDICARE

## 2023-01-18 ENCOUNTER — DOCUMENTATION ONLY (OUTPATIENT)
Dept: INTERNAL MEDICINE | Facility: CLINIC | Age: 73
End: 2023-01-18
Payer: MEDICARE

## 2023-01-18 NOTE — TELEPHONE ENCOUNTER
----- Message from Valery Espinoza NP sent at 1/18/2023  8:04 AM CST -----  Please let pt know that labs stable.  She is medically cleared for cataract sx.  Mckayla, please print labs and send with form and EKG.

## 2023-02-13 PROBLEM — Z00.00 ENCOUNTER FOR MEDICARE ANNUAL WELLNESS EXAM: Status: RESOLVED | Noted: 2022-11-14 | Resolved: 2023-02-13

## 2023-04-10 ENCOUNTER — TELEPHONE (OUTPATIENT)
Dept: INTERNAL MEDICINE | Facility: CLINIC | Age: 73
End: 2023-04-10
Payer: MEDICARE

## 2023-04-10 NOTE — TELEPHONE ENCOUNTER
----- Message from Kristin Frye sent at 4/10/2023 12:43 PM CDT -----  Regarding: surgery clearance  Type:  Needs Medical Advice    Who Called: odalis Chavez Call Back Number: [phone]  Additional Information: surgery clearance needed for Dr Huy Isaac faxed over req on 4/3 please contact pt to schedule appt,  surgery not scheduled yet

## 2023-04-11 NOTE — TELEPHONE ENCOUNTER
Dr. Colon will fill out form tomorrow.    Provider Comments  Provider Comments:   PT NO SHOWED TODAY      Signatures   Electronically signed by : Hernan Fraire, ; Oct 13 2017  3:10PM EST                       (Author)

## 2023-04-13 ENCOUNTER — TELEPHONE (OUTPATIENT)
Dept: INTERNAL MEDICINE | Facility: CLINIC | Age: 73
End: 2023-04-13
Payer: MEDICARE

## 2023-04-13 NOTE — TELEPHONE ENCOUNTER
----- Message from Bety Tejada sent at 4/13/2023  3:14 PM CDT -----  Regarding: Needs Medical Advice  Type:  Needs Medical Advice    Who Called: Tish aly/ Dr. Huy Isaac's office    Would the patient rather a call back or a response via [facility]?   Best Call Back Number:[phone]   Additional Information: Tish aly/ Dr. Huy Isaac's office called again regarding surgery clearance for the pt. A message was put in on 4/10 but stated still has no response as of yet. Please contact Tish regarding the pt.

## 2023-06-07 DIAGNOSIS — J30.2 SEASONAL ALLERGIES: Primary | ICD-10-CM

## 2023-06-07 RX ORDER — FLUTICASONE PROPIONATE 50 MCG
2 SPRAY, SUSPENSION (ML) NASAL DAILY
Qty: 15.8 ML | Refills: 5 | Status: SHIPPED | OUTPATIENT
Start: 2023-06-07

## 2023-07-18 DIAGNOSIS — Z78.0 POST-MENOPAUSE: ICD-10-CM

## 2023-07-18 DIAGNOSIS — J30.2 SEASONAL ALLERGIES: ICD-10-CM

## 2023-07-18 RX ORDER — RALOXIFENE HYDROCHLORIDE 60 MG/1
TABLET, FILM COATED ORAL
Qty: 90 TABLET | Refills: 3 | Status: SHIPPED | OUTPATIENT
Start: 2023-07-18

## 2023-07-18 RX ORDER — LEVOCETIRIZINE DIHYDROCHLORIDE 5 MG/1
TABLET, FILM COATED ORAL
Qty: 90 TABLET | Refills: 3 | Status: SHIPPED | OUTPATIENT
Start: 2023-07-18

## 2023-10-20 ENCOUNTER — PATIENT MESSAGE (OUTPATIENT)
Dept: NEUROLOGY | Facility: CLINIC | Age: 73
End: 2023-10-20
Payer: MEDICARE

## 2023-10-26 ENCOUNTER — OFFICE VISIT (OUTPATIENT)
Dept: NEUROLOGY | Facility: CLINIC | Age: 73
End: 2023-10-26
Payer: MEDICARE

## 2023-10-26 DIAGNOSIS — G47.33 OSA ON CPAP: Primary | ICD-10-CM

## 2023-10-26 PROCEDURE — 1159F MED LIST DOCD IN RCRD: CPT | Mod: CPTII,95,, | Performed by: NURSE PRACTITIONER

## 2023-10-26 PROCEDURE — 99213 PR OFFICE/OUTPT VISIT, EST, LEVL III, 20-29 MIN: ICD-10-PCS | Mod: 95,,, | Performed by: NURSE PRACTITIONER

## 2023-10-26 PROCEDURE — 1160F RVW MEDS BY RX/DR IN RCRD: CPT | Mod: CPTII,95,, | Performed by: NURSE PRACTITIONER

## 2023-10-26 PROCEDURE — 1159F PR MEDICATION LIST DOCUMENTED IN MEDICAL RECORD: ICD-10-PCS | Mod: CPTII,95,, | Performed by: NURSE PRACTITIONER

## 2023-10-26 PROCEDURE — 99213 OFFICE O/P EST LOW 20 MIN: CPT | Mod: 95,,, | Performed by: NURSE PRACTITIONER

## 2023-10-26 PROCEDURE — 1160F PR REVIEW ALL MEDS BY PRESCRIBER/CLIN PHARMACIST DOCUMENTED: ICD-10-PCS | Mod: CPTII,95,, | Performed by: NURSE PRACTITIONER

## 2023-10-26 NOTE — PROGRESS NOTES
Virtual Visit Note    Subjective:          Patient ID: Sanjana Beyer is a 73 y.o. female.    Chief Complaint: annual follow up for DARI    HPI:           The patient location is: home   Visit type: audiovisual      Cont to feel PAP therapy is beneficial; feels refreshed when awakening; denies EDS  Denies issues with mask/machine    PAP data:  date range 2023 - 10/24/2023  days used >=4hr 27/30  90%  AHI 4.5  Auto 4-20 cm           10/20/2023    11:36 AM   EPWORTH SLEEPINESS SCALE   Sitting and reading 1   Watching TV 1   Sitting, inactive in a public place (e.g. a theatre or a meeting) 0   As a passenger in a car for an hour without a break 1   Lying down to rest in the afternoon when circumstances permit 1   Sitting and talking to someone 0   Sitting quietly after a lunch without alcohol 1   In a car, while stopped for a few minutes in traffic 0   Total score 5       This is a telemedicine note. After obtaining verbal consent/patient identification using name and , patient was treated using real time audio/video, according to [facility] protocols. Roberto ZURITA NP [distant provider], conducted the visit from location identified below. The patient participated in the visit at a non-[facility] location selected by the patient (or patients representative), identified below. I am licensed in the state where the patient stated they are located. The patient (or patients representative) stated that they understood and accepted the privacy and security risks to their information at their location.    Distant provider was located at [facility]    Each patient to whom he or she provides medical services by telemedicine is:  (1) informed of the relationship between the physician and patient and the respective role of any other health care provider with respect to management of the patient; and (2) notified that he or she may decline to receive medical services by telemedicine and may  withdraw from such care at any time.            Past Medical History:   Diagnosis Date    Ductal carcinoma in situ of breast 12/11/2013    right breast    Herpes zoster 11/14/2022    DARI (obstructive sleep apnea)     DARI (obstructive sleep apnea)     uses CPAP    Osteoporosis 11/14/2022    Personal history of colonic polyps 12/18/2020    Primary hypertension 3/29/2022    Shingles        Past Surgical History:   Procedure Laterality Date    COLONOSCOPY W/ POLYPECTOMY  12/18/2020    MASTECTOMY Bilateral        Family History   Problem Relation Age of Onset    Alzheimer's disease Mother        Social History     Socioeconomic History    Marital status:    Tobacco Use    Smoking status: Never    Smokeless tobacco: Never   Substance and Sexual Activity    Alcohol use: Yes     Alcohol/week: 12.0 standard drinks of alcohol     Types: 10 Glasses of wine, 2 Cans of beer per week    Drug use: Never    Sexual activity: Not Currently     Birth control/protection: Post-menopausal   Social History Narrative    ** Merged History Encounter **            Review of patient's allergies indicates:  No Known Allergies    Current Outpatient Medications   Medication Instructions    cholecalciferol (vitamin D3) 5,000 Units, Oral    EScitalopram oxalate (LEXAPRO) 10 MG tablet TAKE 1 TABLET BY MOUTH ONCE DAILY..........DEPRESSION    fluticasone propionate (FLONASE) 100 mcg, Each Nostril, Daily    GINKGO BILOBA ORAL ginkgo biloba Take No date recorded No form recorded No frequency recorded No route recorded No set duration recorded No set duration amount recorded suspended No dosage strength recorded No dosage strength units of measure recorded    levocetirizine (XYZAL) 5 MG tablet TAKE 1 TABLET BY MOUTH EVERY EVENING........ALLERGY    multivitamin (THERAGRAN) per tablet 1 tablet, Oral, Daily    MYRBETRIQ 50 mg, Oral, Daily    raloxifene (EVISTA) 60 mg tablet TAKE 1 TABLET (60 MG TOTAL) BY MOUTH ONCE DAILY.         Objective:       Physical Exam:  General- Patient alert and oriented x3 in NAD  Speech - nml  HEENT- EOMI  Resp- No increased WOB noted. Not using accessory muscles.  Skin-  No Jaundice. No visible skin lesions.        Assessment/Plan:     Problem List Items Addressed This Visit           DARI on CPAP - Primary    Patient is benefiting from PAP therapy; Encouraged continued use of PAP. Drowsy driving may still occur despite PAP use. Clinical follow up and replacement of supplies discussed.    DME:Daniel THOMAS in 1 year - telemed                   Roberto Sanches, MSN, APRN, AGACNP-BC

## 2023-11-09 ENCOUNTER — TELEPHONE (OUTPATIENT)
Dept: INTERNAL MEDICINE | Facility: CLINIC | Age: 73
End: 2023-11-09
Payer: MEDICARE

## 2023-11-09 NOTE — TELEPHONE ENCOUNTER
----- Message from Mary Kauffman LPN sent at 11/9/2023  7:49 AM CST -----  Regarding: XIANG Colon 11/15/23 @1:40p  Are there any outstanding tasks in the chart? Pt will need fasting labs     Is there any documentation of tasks? no    Has patient seen another physician, been to the ER, C, or admitted to hospital since last visit?    Has the patient done blood work or imaging since last visit?

## 2023-11-13 ENCOUNTER — LAB VISIT (OUTPATIENT)
Dept: LAB | Facility: HOSPITAL | Age: 73
End: 2023-11-13
Attending: INTERNAL MEDICINE
Payer: MEDICARE

## 2023-11-13 DIAGNOSIS — G47.33 OSA ON CPAP: ICD-10-CM

## 2023-11-13 DIAGNOSIS — N39.46 MIXED STRESS AND URGE URINARY INCONTINENCE: ICD-10-CM

## 2023-11-13 DIAGNOSIS — M85.80 OSTEOPENIA, UNSPECIFIED LOCATION: ICD-10-CM

## 2023-11-13 DIAGNOSIS — K21.9 GASTROESOPHAGEAL REFLUX DISEASE WITHOUT ESOPHAGITIS: ICD-10-CM

## 2023-11-13 DIAGNOSIS — E78.5 HYPERLIPIDEMIA, UNSPECIFIED HYPERLIPIDEMIA TYPE: ICD-10-CM

## 2023-11-13 DIAGNOSIS — Z00.00 ENCOUNTER FOR MEDICARE ANNUAL WELLNESS EXAM: ICD-10-CM

## 2023-11-13 LAB
ALBUMIN SERPL-MCNC: 3.7 G/DL (ref 3.4–4.8)
ALBUMIN/GLOB SERPL: 1.2 RATIO (ref 1.1–2)
ALP SERPL-CCNC: 77 UNIT/L (ref 40–150)
ALT SERPL-CCNC: 41 UNIT/L (ref 0–55)
AST SERPL-CCNC: 32 UNIT/L (ref 5–34)
BILIRUB SERPL-MCNC: 0.7 MG/DL
BUN SERPL-MCNC: 10 MG/DL (ref 9.8–20.1)
CALCIUM SERPL-MCNC: 9 MG/DL (ref 8.4–10.2)
CHLORIDE SERPL-SCNC: 107 MMOL/L (ref 98–107)
CHOLEST SERPL-MCNC: 181 MG/DL
CHOLEST/HDLC SERPL: 4 {RATIO} (ref 0–5)
CO2 SERPL-SCNC: 27 MMOL/L (ref 23–31)
CREAT SERPL-MCNC: 0.69 MG/DL (ref 0.55–1.02)
GFR SERPLBLD CREATININE-BSD FMLA CKD-EPI: >60 MLS/MIN/1.73/M2
GLOBULIN SER-MCNC: 3 GM/DL (ref 2.4–3.5)
GLUCOSE SERPL-MCNC: 117 MG/DL (ref 82–115)
HDLC SERPL-MCNC: 47 MG/DL (ref 35–60)
LDLC SERPL CALC-MCNC: 112 MG/DL (ref 50–140)
POTASSIUM SERPL-SCNC: 4.3 MMOL/L (ref 3.5–5.1)
PROT SERPL-MCNC: 6.7 GM/DL (ref 5.8–7.6)
SODIUM SERPL-SCNC: 141 MMOL/L (ref 136–145)
TRIGL SERPL-MCNC: 110 MG/DL (ref 37–140)
VLDLC SERPL CALC-MCNC: 22 MG/DL

## 2023-11-13 PROCEDURE — 80061 LIPID PANEL: CPT

## 2023-11-13 PROCEDURE — 80053 COMPREHEN METABOLIC PANEL: CPT

## 2023-11-13 PROCEDURE — 36415 COLL VENOUS BLD VENIPUNCTURE: CPT

## 2023-11-15 ENCOUNTER — OFFICE VISIT (OUTPATIENT)
Dept: INTERNAL MEDICINE | Facility: CLINIC | Age: 73
End: 2023-11-15
Payer: MEDICARE

## 2023-11-15 ENCOUNTER — LAB VISIT (OUTPATIENT)
Dept: LAB | Facility: HOSPITAL | Age: 73
End: 2023-11-15
Attending: INTERNAL MEDICINE
Payer: MEDICARE

## 2023-11-15 VITALS
RESPIRATION RATE: 18 BRPM | HEART RATE: 74 BPM | SYSTOLIC BLOOD PRESSURE: 120 MMHG | BODY MASS INDEX: 31.39 KG/M2 | DIASTOLIC BLOOD PRESSURE: 76 MMHG | OXYGEN SATURATION: 99 % | HEIGHT: 67 IN | WEIGHT: 200 LBS

## 2023-11-15 DIAGNOSIS — G47.33 OSA ON CPAP: ICD-10-CM

## 2023-11-15 DIAGNOSIS — F33.1 MODERATE EPISODE OF RECURRENT MAJOR DEPRESSIVE DISORDER: ICD-10-CM

## 2023-11-15 DIAGNOSIS — R73.9 HYPERGLYCEMIA: ICD-10-CM

## 2023-11-15 DIAGNOSIS — R61 EXCESSIVE SWEATING: Primary | ICD-10-CM

## 2023-11-15 DIAGNOSIS — R73.09 ELEVATED GLUCOSE: ICD-10-CM

## 2023-11-15 DIAGNOSIS — R61 EXCESSIVE SWEATING: ICD-10-CM

## 2023-11-15 DIAGNOSIS — Z78.0 MENOPAUSE: ICD-10-CM

## 2023-11-15 DIAGNOSIS — M54.9 CHRONIC BACK PAIN, UNSPECIFIED BACK LOCATION, UNSPECIFIED BACK PAIN LATERALITY: ICD-10-CM

## 2023-11-15 DIAGNOSIS — G89.29 CHRONIC BACK PAIN, UNSPECIFIED BACK LOCATION, UNSPECIFIED BACK PAIN LATERALITY: ICD-10-CM

## 2023-11-15 DIAGNOSIS — Z00.00 ENCOUNTER FOR MEDICARE ANNUAL WELLNESS EXAM: ICD-10-CM

## 2023-11-15 DIAGNOSIS — F41.9 ANXIETY: ICD-10-CM

## 2023-11-15 DIAGNOSIS — M85.80 OSTEOPENIA, UNSPECIFIED LOCATION: ICD-10-CM

## 2023-11-15 LAB
EST. AVERAGE GLUCOSE BLD GHB EST-MCNC: 111.2 MG/DL
HBA1C MFR BLD: 5.5 %
TSH SERPL-ACNC: 2.27 UIU/ML (ref 0.35–4.94)

## 2023-11-15 PROCEDURE — 1126F AMNT PAIN NOTED NONE PRSNT: CPT | Mod: CPTII,,, | Performed by: INTERNAL MEDICINE

## 2023-11-15 PROCEDURE — 1159F MED LIST DOCD IN RCRD: CPT | Mod: CPTII,,, | Performed by: INTERNAL MEDICINE

## 2023-11-15 PROCEDURE — 3074F SYST BP LT 130 MM HG: CPT | Mod: CPTII,,, | Performed by: INTERNAL MEDICINE

## 2023-11-15 PROCEDURE — 3078F DIAST BP <80 MM HG: CPT | Mod: CPTII,,, | Performed by: INTERNAL MEDICINE

## 2023-11-15 PROCEDURE — 36415 COLL VENOUS BLD VENIPUNCTURE: CPT

## 2023-11-15 PROCEDURE — 1160F PR REVIEW ALL MEDS BY PRESCRIBER/CLIN PHARMACIST DOCUMENTED: ICD-10-PCS | Mod: CPTII,,, | Performed by: INTERNAL MEDICINE

## 2023-11-15 PROCEDURE — 3288F FALL RISK ASSESSMENT DOCD: CPT | Mod: CPTII,,, | Performed by: INTERNAL MEDICINE

## 2023-11-15 PROCEDURE — G0439 PR MEDICARE ANNUAL WELLNESS SUBSEQUENT VISIT: ICD-10-PCS | Mod: ,,, | Performed by: INTERNAL MEDICINE

## 2023-11-15 PROCEDURE — 84443 ASSAY THYROID STIM HORMONE: CPT

## 2023-11-15 PROCEDURE — 3078F PR MOST RECENT DIASTOLIC BLOOD PRESSURE < 80 MM HG: ICD-10-PCS | Mod: CPTII,,, | Performed by: INTERNAL MEDICINE

## 2023-11-15 PROCEDURE — 83036 HEMOGLOBIN GLYCOSYLATED A1C: CPT

## 2023-11-15 PROCEDURE — 1101F PT FALLS ASSESS-DOCD LE1/YR: CPT | Mod: CPTII,,, | Performed by: INTERNAL MEDICINE

## 2023-11-15 PROCEDURE — 3074F PR MOST RECENT SYSTOLIC BLOOD PRESSURE < 130 MM HG: ICD-10-PCS | Mod: CPTII,,, | Performed by: INTERNAL MEDICINE

## 2023-11-15 PROCEDURE — 1160F RVW MEDS BY RX/DR IN RCRD: CPT | Mod: CPTII,,, | Performed by: INTERNAL MEDICINE

## 2023-11-15 PROCEDURE — 1159F PR MEDICATION LIST DOCUMENTED IN MEDICAL RECORD: ICD-10-PCS | Mod: CPTII,,, | Performed by: INTERNAL MEDICINE

## 2023-11-15 PROCEDURE — 3288F PR FALLS RISK ASSESSMENT DOCUMENTED: ICD-10-PCS | Mod: CPTII,,, | Performed by: INTERNAL MEDICINE

## 2023-11-15 PROCEDURE — 1101F PR PT FALLS ASSESS DOC 0-1 FALLS W/OUT INJ PAST YR: ICD-10-PCS | Mod: CPTII,,, | Performed by: INTERNAL MEDICINE

## 2023-11-15 PROCEDURE — 1126F PR PAIN SEVERITY QUANTIFIED, NO PAIN PRESENT: ICD-10-PCS | Mod: CPTII,,, | Performed by: INTERNAL MEDICINE

## 2023-11-15 PROCEDURE — G0439 PPPS, SUBSEQ VISIT: HCPCS | Mod: ,,, | Performed by: INTERNAL MEDICINE

## 2023-11-15 RX ORDER — ESCITALOPRAM OXALATE 10 MG/1
10 TABLET ORAL DAILY
Qty: 10 TABLET | Refills: 0 | Status: SHIPPED | OUTPATIENT
Start: 2023-11-15 | End: 2024-01-12

## 2023-11-15 RX ORDER — DESVENLAFAXINE SUCCINATE 50 MG/1
50 TABLET, EXTENDED RELEASE ORAL DAILY
Qty: 30 TABLET | Refills: 11 | Status: SHIPPED | OUTPATIENT
Start: 2023-11-15 | End: 2024-01-12

## 2023-11-15 NOTE — ASSESSMENT & PLAN NOTE
The 10-year ASCVD risk score (Issac RIOS, et al., 2019) is: 14.1%    Values used to calculate the score:      Age: 73 years      Sex: Female      Is Non- : No      Diabetic: No      Tobacco smoker: No      Systolic Blood Pressure: 120 mmHg      Is BP treated: No      HDL Cholesterol: 47 mg/dL      Total Cholesterol: 181 mg/dL    She isn't interestedin a statin.    Recent labs reviewed and discussed.

## 2023-11-15 NOTE — PROGRESS NOTES
Subjective:        Patient Care Team:  Caitlyn Colon MD as PCP - General (Internal Medicine)     Chief Complaint: Medicare AWV (Discuss labs /C/o pain on R ring finger and a lump)      HPI:She is here for a medicare wellness.  She c/o some arthritis pain in her right ring finger.  She has some nodules on the right ring finger and not on the left.  She also has back pain when she tries to lean over.  And she gets rt hip pain if she turns a certain way.  She doesn't exercise.  She says she is too lazy.  She c/o ongoing balance problems.    She thinks she is having hot flashes.  She isn't on any HRT because of a h/o breast cancer.  She will occ break out in a sweat.  She does take mushrooms as a supplement.  She started taking this about 6 mos ago.  The sweating started an unclear time ago.  The sweating comes and goes -- maybe once or twice per month.  It can happen while grocery shopping -- but not necessarily w/ exertion.    She wants to switch to a different antidepressant.  She doesn't feel the 20 mg dose helps her any more than the 10 mg dose.  Problem Noted   Encounter for Medicare Annual Wellness Exam 11/14/2022   Chronic Back Pain 11/15/2023   Hyperglycemia 11/15/2023   Diffuse Spasm of Esophagus 11/14/2022   Osteoarthritis of Left Hand 11/14/2022   Ulnar Neuritis 11/14/2022   Urinary Incontinence 11/14/2022   History of Ductal Carcinoma in Situ (Dcis) of Breast 11/14/2022   Depression 11/14/2022   Eye Pain 11/14/2022   Sensorineural Hearing Loss (Snhl) of Both Ears 9/24/2019    She sees [facility] -- Anahi Nguyen     Tinnitus of Both Ears 9/24/2019   Syd On Cpap 12/7/2013    She is followed by [facility]     Gerd (Gastroesophageal Reflux Disease) 12/7/2013   Osteopenia 12/7/2013    Her last BMD in 5/21 showed only osteopenia.  She is on Evista.     Osteoporosis (Resolved) 11/14/2022   Herpes Zoster (Resolved) 11/14/2022   Primary Hypertension (Resolved) 3/29/2022   Ductal Carcinoma in Situ of  Breast (Resolved) 12/11/2013    right breast          The patient's Health Maintenance was reviewed and the following appears to be due:   Health Maintenance Due   Topic Date Due    Shingles Vaccine (1 of 2) Never done    RSV Vaccine (Age 60+) (1 - 1-dose 60+ series) Never done    Pneumococcal Vaccines (Age 65+) (2 - PCV) 04/09/2013    DEXA Scan  05/13/2023    COVID-19 Vaccine (3 - 2023-24 season) 09/01/2023       Past Medical History:  Past Medical History:   Diagnosis Date    Ductal carcinoma in situ of breast 12/11/2013    right breast    Herpes zoster 11/14/2022    DARI (obstructive sleep apnea)     DARI (obstructive sleep apnea)     uses CPAP    Osteoporosis 11/14/2022    Personal history of colonic polyps 12/18/2020    Primary hypertension 3/29/2022    Shingles      Past Surgical History:   Procedure Laterality Date    COLONOSCOPY W/ POLYPECTOMY  12/18/2020    COSMETIC SURGERY  Eyelid 7/31/23    EYE SURGERY  Cataract both eyes 1/25/23    MASTECTOMY Bilateral      Review of patient's allergies indicates:  No Known Allergies  Current Outpatient Medications on File Prior to Visit   Medication Sig Dispense Refill    cholecalciferol, vitamin D3, 125 mcg (5,000 unit) Tab Take 5,000 Units by mouth.      fluticasone propionate (FLONASE) 50 mcg/actuation nasal spray 2 sprays (100 mcg total) by Each Nostril route once daily. 15.8 mL 5    GINKGO BILOBA ORAL ginkgo biloba Take No date recorded No form recorded No frequency recorded No route recorded No set duration recorded No set duration amount recorded suspended No dosage strength recorded No dosage strength units of measure recorded      levocetirizine (XYZAL) 5 MG tablet TAKE 1 TABLET BY MOUTH EVERY EVENING........ALLERGY 90 tablet 3    LUTEIN ORAL Take by mouth.      multivitamin (THERAGRAN) per tablet Take 1 tablet by mouth once daily.      raloxifene (EVISTA) 60 mg tablet TAKE 1 TABLET (60 MG TOTAL) BY MOUTH ONCE DAILY. 90 tablet 3    turmeric (CURCUMIN MISC) by  Misc.(Non-Drug; Combo Route) route.      ZEAXANTHIN, BULK, MISC by Misc.(Non-Drug; Combo Route) route.      [DISCONTINUED] EScitalopram oxalate (LEXAPRO) 10 MG tablet TAKE 1 TABLET BY MOUTH ONCE DAILY..........DEPRESSION (Patient taking differently: Take 20 mg by mouth once daily.) 90 tablet 1    mirabegron (MYRBETRIQ) 50 mg Tb24 Take 1 tablet (50 mg total) by mouth once daily. 30 tablet 11     No current facility-administered medications on file prior to visit.     Social History     Socioeconomic History    Marital status:    Tobacco Use    Smoking status: Never    Smokeless tobacco: Never   Substance and Sexual Activity    Alcohol use: Yes     Alcohol/week: 12.0 standard drinks of alcohol     Types: 10 Glasses of wine, 2 Cans of beer per week    Drug use: Never    Sexual activity: Not Currently     Birth control/protection: Post-menopausal   Social History Narrative    ** Merged History Encounter **          Family History   Problem Relation Age of Onset    Alzheimer's disease Mother        Review of Systems    Patient Reported Health Risk Assessment  What [age] 70-79  Are you male or female?: Female  During the past four weeks, how much have you been bothered by emotional problems such as feeling anxious, depressed, irritable, sad, or downhearted and blue?: Not at all  During the past five weeks, has your physical and/or emotional health limited your social activities with family, friends, neighbors, or groups?: Not at all  During the past four weeks, how much bodily pain have you generally had?: No pain  During the past four weeks, was someone available to help if you needed and wanted help?: Yes, as much as I wanted  During the past four weeks, what was the hardest physical activity you could do for at least two minutes?: Moderate  Can you get to places out of walking distance without help?  (For example, can you travel alone on buses or taxis, or drive your own car?): Yes  Can you go shopping  for groceries or clothes without someone's help?: Yes  Can you prepare your own meals?: Yes  Can you do your own housework without help?: Yes  Because of any health problems, do you need the help of another person with your personal care needs such as eating, bathing, dressing, or getting around the house?: No  Can you handle your own money without help?: Yes  During the past four weeks, how would you rate your health in general?: Excellent  How have things been going for you during the past four weeks?: Very well  Are you having difficulties driving your car?: No  Do you always fasten your seat belt when you are in a car?: No  How often in the past four weeks have you been bothered by falling or dizzy when standing up?: Never  How often in the past four weeks have you been bothered by sexual problems?: Never  How often in the past four weeks have you been bothered by trouble eating well?: Never  How often in the past four weeks have you been bothered by teeth or denture problems?: Never  How often in the past four weeks have you been bothered with problems using the telephone?: Never  How often in the past four weeks have you been bothered by tiredness or fatigue?: Never  Have you fallen two or more times in the past year?: No  Are you afraid of falling?: No  Are you a smoker?: No  During the past four weeks, how many drinks of wine, beer, or other alcoholic beverages did you have?: 6-9 drinks per week  Do you exercise for about 20 minutes three or more days a week?: No, I usually do not exercise this much  Have you been given any information to help you with hazards in your house that might hurt you?: Yes  Have you been given any information to help you with keeping track of your medications?: Yes  How often do you have trouble taking medicines the way you've been told to take them?: I always take them as prescribed  How confident are you that you can control and manage most of your health problems?: Very  "confident  What is your race? (Check all that apply.):     Opioid Screening: Patient medication list reviewed, patient is not taking prescription opioids. Patient is not using additional opioids than prescribed. Patient is at low risk of substance abuse based on this opioid use history.     Objective:   /76 (BP Location: Left arm, Patient Position: Sitting, BP Method: Small (Manual))   Pulse 74   Resp 18   Ht 5' 6.93" (1.7 m)   Wt 90.7 kg (200 lb)   SpO2 99%   BMI 31.39 kg/m²     Physical Exam  Constitutional:       General: She is not in acute distress.     Appearance: Normal appearance.   HENT:      Head: Normocephalic and atraumatic.   Eyes:      General: No scleral icterus.     Conjunctiva/sclera: Conjunctivae normal.   Neck:      Vascular: No carotid bruit.   Cardiovascular:      Rate and Rhythm: Normal rate and regular rhythm.      Pulses: Normal pulses.      Heart sounds: Normal heart sounds. No murmur heard.     No friction rub. No gallop.   Pulmonary:      Effort: Pulmonary effort is normal.      Breath sounds: Normal breath sounds.   Abdominal:      General: Bowel sounds are normal.      Palpations: Abdomen is soft. There is no mass.      Tenderness: There is no abdominal tenderness. There is no guarding or rebound.   Musculoskeletal:         General: No deformity.      Cervical back: No rigidity or tenderness.      Right lower leg: No edema.      Left lower leg: No edema.      Comments: Some nodules on pip joint of the right ringer finger.   Lymphadenopathy:      Cervical: No cervical adenopathy.   Skin:     General: Skin is warm and dry.      Coloration: Skin is not jaundiced or pale.      Findings: No erythema.   Neurological:      General: No focal deficit present.      Mental Status: She is alert and oriented to person, place, and time.      Gait: Gait normal.   Psychiatric:         Mood and Affect: Mood normal.         Behavior: Behavior normal.         Thought Content: Thought " content normal.         Judgment: Judgment normal.                No data to display                  11/15/2023     1:40 PM 1/17/2023     2:00 PM 11/14/2022     1:00 PM   Fall Risk Assessment - Outpatient   Mobility Status Ambulatory Ambulatory Ambulatory   Number of falls 0 0 0   Identified as fall risk False False False           Depression Screening  Over the past two weeks, has the patient felt down, depressed, or hopeless?: No  Over the past two weeks, has the patient felt little interest or pleasure in doing things?: No  Functional Ability/Safety Screening  Was the patient's timed Up & Go test unsteady or longer than 30 seconds?: No  Does the patient need help with phone, transportation, shopping, preparing meals, housework, laundry, meds, or managing money?: No  Does the patient's home have rugs in the hallway, lack grab bars in the bathroom, lack handrails on the stairs or have poor lighting?: No  Have you noticed any hearing difficulties?: No  Cognitive Function (Assessed through direct observation with due consideration of information obtained by way of patient reports and/or concerns raised by family, friends, caretakers, or others)    Does the patient repeat questions/statements in the same day?: No  Does the patient have trouble remembering the date, year, and time?: No  Does the patient have difficulty managing finances?: No  Does the patient have a decreased sense of direction?: No    Assessment and Plan:     Encounter for Medicare annual wellness exam  The 10-year ASCVD risk score (Issac RIOS, et al., 2019) is: 14.1%    Values used to calculate the score:      Age: 73 years      Sex: Female      Is Non- : No      Diabetic: No      Tobacco smoker: No      Systolic Blood Pressure: 120 mmHg      Is BP treated: No      HDL Cholesterol: 47 mg/dL      Total Cholesterol: 181 mg/dL    She isn't interestedin a statin.    Recent labs reviewed and discussed.    Depression  She wants to  try something different.  Rxfor Pristiq and instructions on how to taper off the Lexapro and onto the pristiq.    DARI on CPAP  She is compliant with her cpap.    Chronic back pain  Regularexercise recommended.    Hyperglycemia  Check A1c.    Osteopenia  Reepat bmd.     Follow up in about 6 weeks (around 12/27/2023).    Medications Ordered This Encounter   Medications    desvenlafaxine succinate (PRISTIQ) 50 MG Tb24     Sig: Take 1 tablet (50 mg total) by mouth once daily.     Dispense:  30 tablet     Refill:  11    EScitalopram oxalate (LEXAPRO) 10 MG tablet     Sig: Take 1 tablet (10 mg total) by mouth once daily.     Dispense:  10 tablet     Refill:  0     [unfilled]  Orders Placed This Encounter   Procedures    DXA Bone Density Axial Skeleton 1 or more sites     Standing Status:   Future     Standing Expiration Date:   11/15/2026     Order Specific Question:   May the Radiologist modify the order per protocol to meet the clinical needs of the patient?     Answer:   Yes     Order Specific Question:   Release to patient     Answer:   Immediate    Hemoglobin A1C     Standing Status:   Future     Standing Expiration Date:   1/13/2025    TSH     Standing Status:   Future     Standing Expiration Date:   1/13/2025         Medicare Annual Wellness and Personalized Prevention Plan:   Fall Risk + Home Safety + Hearing Impairment + Depression Screen + Cognitive Impairment Screen + Health Risk Assessment all reviewed    Advance Care Planning   I attest to discussing Advance Care Planning with patient and/or family member.  Education was provided including the importance of the Health Care Power of , Advance Directives, and/or [place] documentation.  The patient expressed understanding to the importance of this information and discussion.       Follow up in about 6 weeks (around 12/27/2023). In addition to their scheduled follow up, the patient has also been instructed to follow up on as needed basis.

## 2023-11-15 NOTE — ASSESSMENT & PLAN NOTE
She wants to try something different.  Rxfor Pristiq and instructions on how to taper off the Lexapro and onto the pristiq.

## 2023-11-22 ENCOUNTER — HOSPITAL ENCOUNTER (OUTPATIENT)
Dept: RADIOLOGY | Facility: HOSPITAL | Age: 73
Discharge: HOME | End: 2023-11-22
Attending: INTERNAL MEDICINE
Payer: MEDICARE

## 2023-11-22 DIAGNOSIS — Z78.0 MENOPAUSE: ICD-10-CM

## 2023-11-22 PROCEDURE — 77080 DXA BONE DENSITY AXIAL: CPT | Mod: 26,,, | Performed by: STUDENT IN AN ORGANIZED HEALTH CARE EDUCATION/TRAINING PROGRAM

## 2023-11-22 PROCEDURE — 77080 DXA BONE DENSITY AXIAL SKELETON 1 OR MORE SITES: ICD-10-PCS | Mod: 26,,, | Performed by: STUDENT IN AN ORGANIZED HEALTH CARE EDUCATION/TRAINING PROGRAM

## 2023-11-22 PROCEDURE — 77080 DXA BONE DENSITY AXIAL: CPT | Mod: TC

## 2023-11-28 NOTE — PROGRESS NOTES
Let her know the BMD shows only osteopenia.  But its very close to osteoporosis.  So recommend weight bearing exercise, sufficient ca/d and repeat bmd in 2 years.

## 2023-12-20 DIAGNOSIS — Z78.0 MENOPAUSE: Primary | ICD-10-CM

## 2023-12-20 RX ORDER — MIRABEGRON 50 MG/1
TABLET, FILM COATED, EXTENDED RELEASE ORAL
Qty: 30 TABLET | Refills: 11 | Status: SHIPPED | OUTPATIENT
Start: 2023-12-20

## 2024-01-12 ENCOUNTER — OFFICE VISIT (OUTPATIENT)
Dept: INTERNAL MEDICINE | Facility: CLINIC | Age: 74
End: 2024-01-12
Payer: MEDICARE

## 2024-01-12 VITALS
SYSTOLIC BLOOD PRESSURE: 134 MMHG | HEART RATE: 78 BPM | HEIGHT: 67 IN | WEIGHT: 197 LBS | DIASTOLIC BLOOD PRESSURE: 70 MMHG | RESPIRATION RATE: 18 BRPM | OXYGEN SATURATION: 96 % | BODY MASS INDEX: 30.92 KG/M2

## 2024-01-12 DIAGNOSIS — F33.1 MODERATE EPISODE OF RECURRENT MAJOR DEPRESSIVE DISORDER: Primary | ICD-10-CM

## 2024-01-12 PROCEDURE — 1159F MED LIST DOCD IN RCRD: CPT | Mod: CPTII,,, | Performed by: INTERNAL MEDICINE

## 2024-01-12 PROCEDURE — 3078F DIAST BP <80 MM HG: CPT | Mod: CPTII,,, | Performed by: INTERNAL MEDICINE

## 2024-01-12 PROCEDURE — 99213 OFFICE O/P EST LOW 20 MIN: CPT | Mod: ,,, | Performed by: INTERNAL MEDICINE

## 2024-01-12 PROCEDURE — 3288F FALL RISK ASSESSMENT DOCD: CPT | Mod: CPTII,,, | Performed by: INTERNAL MEDICINE

## 2024-01-12 PROCEDURE — 1126F AMNT PAIN NOTED NONE PRSNT: CPT | Mod: CPTII,,, | Performed by: INTERNAL MEDICINE

## 2024-01-12 PROCEDURE — 1101F PT FALLS ASSESS-DOCD LE1/YR: CPT | Mod: CPTII,,, | Performed by: INTERNAL MEDICINE

## 2024-01-12 PROCEDURE — 1160F RVW MEDS BY RX/DR IN RCRD: CPT | Mod: CPTII,,, | Performed by: INTERNAL MEDICINE

## 2024-01-12 PROCEDURE — 3075F SYST BP GE 130 - 139MM HG: CPT | Mod: CPTII,,, | Performed by: INTERNAL MEDICINE

## 2024-01-12 RX ORDER — DULOXETIN HYDROCHLORIDE 30 MG/1
30 CAPSULE, DELAYED RELEASE ORAL DAILY
Qty: 30 CAPSULE | Refills: 2 | Status: SHIPPED | OUTPATIENT
Start: 2024-01-12 | End: 2025-01-11

## 2024-01-12 RX ORDER — CLARITHROMYCIN 500 MG/1
500 TABLET, FILM COATED ORAL 2 TIMES DAILY
COMMUNITY
Start: 2024-01-04

## 2024-01-12 NOTE — PROGRESS NOTES
Subjective:      Chief Complaint: Follow-up (6wk/C/o being lightheaded sometimes with the pristiq, she feels like she is always thinking about food but does not have increased appetite. /She does not have any motivation to exercise)      HPI:She is here for f/u depression.  She has more dizziness with the pristiq.  But she reports some anhedonia.  She reports some obsessive thoughts about food.  But she isn't overeating.      She's tried Wellbutrin in the past followed by Lexapro.     Problem Noted   Encounter for Medicare Annual Wellness Exam 11/14/2022   Chronic Back Pain 11/15/2023   Hyperglycemia 11/15/2023   Diffuse Spasm of Esophagus 11/14/2022   Osteoarthritis of Left Hand 11/14/2022   Ulnar Neuritis 11/14/2022   Urinary Incontinence 11/14/2022   History of Ductal Carcinoma in Situ (Dcis) of Breast 11/14/2022   Depression 11/14/2022   Eye Pain 11/14/2022   Sensorineural Hearing Loss (Snhl) of Both Ears 9/24/2019    She sees [facility] -- Anahi Nguyen     Tinnitus of Both Ears 9/24/2019   Syd On Cpap 12/7/2013    She is followed by [facility]     Gerd (Gastroesophageal Reflux Disease) 12/7/2013   Osteopenia 12/7/2013    Her last BMD in 5/21 showed only osteopenia.  She is on Evista.     Osteoporosis (Resolved) 11/14/2022   Herpes Zoster (Resolved) 11/14/2022   Primary Hypertension (Resolved) 3/29/2022   Ductal Carcinoma in Situ of Breast (Resolved) 12/11/2013    right breast          The patient's Health Maintenance was reviewed and the following appears to be due:   Health Maintenance Due   Topic Date Due    Shingles Vaccine (1 of 2) Never done    RSV Vaccine (Age 60+ and Pregnant patients) (1 - 1-dose 60+ series) Never done    Pneumococcal Vaccines (Age 65+) (2 - PCV) 04/09/2013    COVID-19 Vaccine (3 - 2023-24 season) 09/01/2023       Past Medical History:  Past Medical History:   Diagnosis Date    Ductal carcinoma in situ of breast 12/11/2013    right breast    Herpes zoster 11/14/2022    SYD  "(obstructive sleep apnea)     DARI (obstructive sleep apnea)     uses CPAP    Osteoporosis 11/14/2022    Personal history of colonic polyps 12/18/2020    Primary hypertension 3/29/2022    Shingles      Review of patient's allergies indicates:  No Known Allergies  Current Outpatient Medications on File Prior to Visit   Medication Sig Dispense Refill    cholecalciferol, vitamin D3, 125 mcg (5,000 unit) Tab Take 5,000 Units by mouth.      clarithromycin (BIAXIN) 500 MG tablet Take 500 mg by mouth 2 (two) times daily.      fluticasone propionate (FLONASE) 50 mcg/actuation nasal spray 2 sprays (100 mcg total) by Each Nostril route once daily. 15.8 mL 5    GINKGO BILOBA ORAL ginkgo biloba Take No date recorded No form recorded No frequency recorded No route recorded No set duration recorded No set duration amount recorded suspended No dosage strength recorded No dosage strength units of measure recorded      levocetirizine (XYZAL) 5 MG tablet TAKE 1 TABLET BY MOUTH EVERY EVENING........ALLERGY 90 tablet 3    LUTEIN ORAL Take by mouth.      multivitamin (THERAGRAN) per tablet Take 1 tablet by mouth once daily.      MYRBETRIQ 50 mg Tb24 TAKE 1 TABLET (50 MG TOTAL) BY MOUTH ONCE DAILY........BLADDER 30 tablet 11    raloxifene (EVISTA) 60 mg tablet TAKE 1 TABLET (60 MG TOTAL) BY MOUTH ONCE DAILY. 90 tablet 3    turmeric (CURCUMIN MISC) by Misc.(Non-Drug; Combo Route) route.      ZEAXANTHIN, BULK, MISC by Misc.(Non-Drug; Combo Route) route.      [DISCONTINUED] desvenlafaxine succinate (PRISTIQ) 50 MG Tb24 Take 1 tablet (50 mg total) by mouth once daily. 30 tablet 11    [DISCONTINUED] EScitalopram oxalate (LEXAPRO) 10 MG tablet Take 1 tablet (10 mg total) by mouth once daily. 10 tablet 0     No current facility-administered medications on file prior to visit.       Review of Systems    Objective:   /70 (BP Location: Left arm, Patient Position: Sitting, BP Method: Small (Manual))   Pulse 78   Resp 18   Ht 5' 6.93" (1.7 " m)   Wt 89.4 kg (197 lb)   SpO2 96%   BMI 30.92 kg/m²     Physical Exam  Vitals reviewed.   Constitutional:       General: She is not in acute distress.     Appearance: Normal appearance. She is not ill-appearing or diaphoretic.   HENT:      Head: Normocephalic and atraumatic.   Pulmonary:      Effort: Pulmonary effort is normal.   Skin:     General: Skin is warm and dry.   Neurological:      General: No focal deficit present.      Mental Status: She is alert.   Psychiatric:         Mood and Affect: Mood normal.         Behavior: Behavior normal.         Thought Content: Thought content normal.         Judgment: Judgment normal.       Assessment and Plan:     Depression  She is having some s.e. from the pristiq and it doesn't seem to be helping.  Will transition her over to Cymbalta 30 mg daily.  I gave her written instructions on how to transition.  Recheck 6 weeks.      Follow up in about 6 weeks (around 2/23/2024).    Medications Ordered This Encounter   Medications    DULoxetine (CYMBALTA) 30 MG capsule     Sig: Take 1 capsule (30 mg total) by mouth once daily.     Dispense:  30 capsule     Refill:  2     [unfilled]  No orders of the defined types were placed in this encounter.

## 2024-01-12 NOTE — ASSESSMENT & PLAN NOTE
She is having some s.e. from the pristiq and it doesn't seem to be helping.  Will transition her over to Cymbalta 30 mg daily.  I gave her written instructions on how to transition.  Recheck 6 weeks.

## 2024-02-19 PROBLEM — Z00.00 ENCOUNTER FOR MEDICARE ANNUAL WELLNESS EXAM: Status: RESOLVED | Noted: 2022-11-14 | Resolved: 2024-02-19

## 2024-03-18 ENCOUNTER — TELEPHONE (OUTPATIENT)
Dept: INTERNAL MEDICINE | Facility: CLINIC | Age: 74
End: 2024-03-18
Payer: MEDICARE

## 2024-03-18 NOTE — TELEPHONE ENCOUNTER
----- Message from Mary Kauffman LPN sent at 3/18/2024  8:25 AM CDT -----  Regarding: XIANG Colon 3/25/24 @11:00a  Are there any outstanding tasks in the chart? no    Is there any documentation of tasks? no    Has patient seen another physician, been to the ER, C, or admitted to hospital since last visit?    Has the patient done blood work or imaging since last visit?

## 2024-03-22 DIAGNOSIS — F41.9 ANXIETY: Primary | ICD-10-CM

## 2024-03-22 RX ORDER — DULOXETIN HYDROCHLORIDE 30 MG/1
CAPSULE, DELAYED RELEASE ORAL
Qty: 30 CAPSULE | Refills: 2 | Status: SHIPPED | OUTPATIENT
Start: 2024-03-22 | End: 2024-03-25

## 2024-03-25 ENCOUNTER — OFFICE VISIT (OUTPATIENT)
Dept: INTERNAL MEDICINE | Facility: CLINIC | Age: 74
End: 2024-03-25
Payer: MEDICARE

## 2024-03-25 ENCOUNTER — HOSPITAL ENCOUNTER (OUTPATIENT)
Dept: RADIOLOGY | Facility: HOSPITAL | Age: 74
Discharge: HOME | End: 2024-03-25
Attending: INTERNAL MEDICINE
Payer: MEDICARE

## 2024-03-25 VITALS
OXYGEN SATURATION: 95 % | HEIGHT: 67 IN | SYSTOLIC BLOOD PRESSURE: 136 MMHG | WEIGHT: 203 LBS | RESPIRATION RATE: 18 BRPM | HEART RATE: 89 BPM | BODY MASS INDEX: 31.86 KG/M2 | DIASTOLIC BLOOD PRESSURE: 80 MMHG

## 2024-03-25 DIAGNOSIS — M54.9 CHRONIC BACK PAIN, UNSPECIFIED BACK LOCATION, UNSPECIFIED BACK PAIN LATERALITY: ICD-10-CM

## 2024-03-25 DIAGNOSIS — M25.562 CHRONIC PAIN OF LEFT KNEE: ICD-10-CM

## 2024-03-25 DIAGNOSIS — G89.29 CHRONIC BACK PAIN, UNSPECIFIED BACK LOCATION, UNSPECIFIED BACK PAIN LATERALITY: ICD-10-CM

## 2024-03-25 DIAGNOSIS — F33.1 MODERATE EPISODE OF RECURRENT MAJOR DEPRESSIVE DISORDER: ICD-10-CM

## 2024-03-25 DIAGNOSIS — M25.562 CHRONIC PAIN OF LEFT KNEE: Primary | ICD-10-CM

## 2024-03-25 DIAGNOSIS — G89.29 CHRONIC PAIN OF LEFT KNEE: Primary | ICD-10-CM

## 2024-03-25 DIAGNOSIS — G89.29 CHRONIC PAIN OF LEFT KNEE: ICD-10-CM

## 2024-03-25 PROBLEM — M25.569 KNEE PAIN: Status: ACTIVE | Noted: 2024-03-25

## 2024-03-25 PROCEDURE — 99214 OFFICE O/P EST MOD 30 MIN: CPT | Mod: ,,, | Performed by: INTERNAL MEDICINE

## 2024-03-25 PROCEDURE — 1101F PT FALLS ASSESS-DOCD LE1/YR: CPT | Mod: CPTII,,, | Performed by: INTERNAL MEDICINE

## 2024-03-25 PROCEDURE — 3075F SYST BP GE 130 - 139MM HG: CPT | Mod: CPTII,,, | Performed by: INTERNAL MEDICINE

## 2024-03-25 PROCEDURE — 73564 X-RAY EXAM KNEE 4 OR MORE: CPT | Mod: TC,LT

## 2024-03-25 PROCEDURE — 1160F RVW MEDS BY RX/DR IN RCRD: CPT | Mod: CPTII,,, | Performed by: INTERNAL MEDICINE

## 2024-03-25 PROCEDURE — 1159F MED LIST DOCD IN RCRD: CPT | Mod: CPTII,,, | Performed by: INTERNAL MEDICINE

## 2024-03-25 PROCEDURE — 3079F DIAST BP 80-89 MM HG: CPT | Mod: CPTII,,, | Performed by: INTERNAL MEDICINE

## 2024-03-25 PROCEDURE — 1126F AMNT PAIN NOTED NONE PRSNT: CPT | Mod: CPTII,,, | Performed by: INTERNAL MEDICINE

## 2024-03-25 PROCEDURE — 3288F FALL RISK ASSESSMENT DOCD: CPT | Mod: CPTII,,, | Performed by: INTERNAL MEDICINE

## 2024-03-25 RX ORDER — IBUPROFEN 600 MG/1
600 TABLET ORAL DAILY PRN
COMMUNITY
End: 2024-03-25

## 2024-03-25 RX ORDER — MELOXICAM 15 MG/1
15 TABLET ORAL DAILY PRN
Qty: 30 TABLET | Refills: 0 | Status: SHIPPED | OUTPATIENT
Start: 2024-03-25 | End: 2024-04-23

## 2024-03-25 NOTE — ASSESSMENT & PLAN NOTE
I rec some strengthening and stretching exercises for her back and proper lifting with her legs and not her back.  I also rec mobic daily for 1 week, then as needed.  We discussed potential s.e. of mobic.

## 2024-03-25 NOTE — PROGRESS NOTES
"Subjective:      Chief Complaint: Follow-up (6wk/Her family thinks she is not showing emotions as much anymore /C/o blurry vision- she thinks it is related to cymbalta)      HPI:She is here for f/u depression.  We had switched her to Cymbalta.  It seemed to be working initially.  But she doesn't think its helping.  It didn't seem to help with the anhedonia.  Her family told her she isn't showing any emotion.  So she wants to get off the antidepressants and see how she does.    She has noticed some blurry vision.  She's had cataract surgery in the past.  She thinks it might be the Cymbalta.      She is worried about her metabolism.  She feels like she doesn't eat a lot, but doesn't lose weight.  Her lifestyle is sedentary.    She had some pain in her left knee that felt muscular in January.  Now it feels like it catches.  She's been putting castor oil on it.  And she's noticed some swelling in "bumps" around the knee.   She takes 600 mg of ibuprofen once daily for the pain.    She is also c/o some lower back pain when she bends over -- such as with gardening.  And she's been having some back pain with walking.  I had recommended pilates.  But her insurance wouldn't cover it.  Problem Noted   Knee Pain 3/25/2024   Chronic Back Pain 11/15/2023   Hyperglycemia 11/15/2023   Diffuse Spasm of Esophagus 11/14/2022   Osteoarthritis of Left Hand 11/14/2022   Ulnar Neuritis 11/14/2022   Urinary Incontinence 11/14/2022   History of Ductal Carcinoma in Situ (Dcis) of Breast 11/14/2022   Depression 11/14/2022   Eye Pain 11/14/2022   Sensorineural Hearing Loss (Snhl) of Both Ears 9/24/2019    She sees [facility] -- Anahi Nguyen     Tinnitus of Both Ears 9/24/2019   Syd On Cpap 12/7/2013    She is followed by [facility]     Gerd (Gastroesophageal Reflux Disease) 12/7/2013   Osteopenia 12/7/2013    Her last BMD in 5/21 showed only osteopenia.  She is on Evista.     Encounter for Medicare Annual Wellness Exam (Resolved) " 11/14/2022   Osteoporosis (Resolved) 11/14/2022   Herpes Zoster (Resolved) 11/14/2022   Primary Hypertension (Resolved) 3/29/2022   Ductal Carcinoma in Situ of Breast (Resolved) 12/11/2013    right breast          The patient's Health Maintenance was reviewed and the following appears to be due:   Health Maintenance Due   Topic Date Due    Shingles Vaccine (1 of 2) Never done    RSV Vaccine (Age 60+ and Pregnant patients) (1 - 1-dose 60+ series) Never done    Pneumococcal Vaccines (Age 65+) (2 of 2 - PCV) 04/09/2013    COVID-19 Vaccine (3 - 2023-24 season) 09/01/2023       Past Medical History:  Past Medical History:   Diagnosis Date    Ductal carcinoma in situ of breast 12/11/2013    right breast    Herpes zoster 11/14/2022    DARI (obstructive sleep apnea)     DARI (obstructive sleep apnea)     uses CPAP    Osteoporosis 11/14/2022    Personal history of colonic polyps 12/18/2020    Primary hypertension 3/29/2022    Shingles      Review of patient's allergies indicates:  No Known Allergies  Current Outpatient Medications on File Prior to Visit   Medication Sig Dispense Refill    cholecalciferol, vitamin D3, 125 mcg (5,000 unit) Tab Take 5,000 Units by mouth.      fluticasone propionate (FLONASE) 50 mcg/actuation nasal spray 2 sprays (100 mcg total) by Each Nostril route once daily. 15.8 mL 5    GINKGO BILOBA ORAL ginkgo biloba Take No date recorded No form recorded No frequency recorded No route recorded No set duration recorded No set duration amount recorded suspended No dosage strength recorded No dosage strength units of measure recorded      levocetirizine (XYZAL) 5 MG tablet TAKE 1 TABLET BY MOUTH EVERY EVENING........ALLERGY 90 tablet 3    LUTEIN ORAL Take by mouth.      multivitamin (THERAGRAN) per tablet Take 1 tablet by mouth once daily.      MYRBETRIQ 50 mg Tb24 TAKE 1 TABLET (50 MG TOTAL) BY MOUTH ONCE DAILY........BLADDER 30 tablet 11    raloxifene (EVISTA) 60 mg tablet TAKE 1 TABLET (60 MG TOTAL) BY  "MOUTH ONCE DAILY. 90 tablet 3    turmeric (CURCUMIN MISC) by Misc.(Non-Drug; Combo Route) route.      ZEAXANTHIN, BULK, MISC by Misc.(Non-Drug; Combo Route) route.      [DISCONTINUED] DULoxetine (CYMBALTA) 30 MG capsule TAKE 1 CAPSULE (30 MG TOTAL) BY MOUTH EVERY DAY 30 capsule 2    [DISCONTINUED] ibuprofen (ADVIL,MOTRIN) 600 MG tablet Take 600 mg by mouth daily as needed for Pain.      [DISCONTINUED] clarithromycin (BIAXIN) 500 MG tablet Take 500 mg by mouth 2 (two) times daily.       No current facility-administered medications on file prior to visit.       Review of Systems    Objective:   /80 (BP Location: Right arm, Patient Position: Sitting, BP Method: Small (Manual))   Pulse 89   Resp 18   Ht 5' 6.93" (1.7 m)   Wt 92.1 kg (203 lb)   SpO2 95%   BMI 31.86 kg/m²     Physical Exam  Vitals reviewed.   Constitutional:       General: She is not in acute distress.     Appearance: Normal appearance. She is not ill-appearing or diaphoretic.   HENT:      Head: Normocephalic and atraumatic.   Pulmonary:      Effort: Pulmonary effort is normal.   Musculoskeletal:      Comments: Left knee seems slightly larger than the right.  And the left knee seems to be leaning inward.   Skin:     General: Skin is warm and dry.   Neurological:      General: No focal deficit present.      Mental Status: She is alert.   Psychiatric:         Mood and Affect: Mood normal.         Behavior: Behavior normal.         Thought Content: Thought content normal.         Judgment: Judgment normal.       Assessment and Plan:     Depression  She feels like the cymbalta is causing blurry vision and blunting her emotion.  I rec she drop to every other day for a week and then every third day for a week, then stop.  Recheck 3 mos.    Knee pain  There is some mild deformity suggesting possible degenerative arthritis.  Will get xray.    Chronic back pain  I rec some strengthening and stretching exercises for her back and proper lifting with her " legs and not her back.  I also rec mobic daily for 1 week, then as needed.  We discussed potential s.e. of mobic.      Follow up in about 3 months (around 6/25/2024).    Medications Ordered This Encounter   Medications    meloxicam (MOBIC) 15 MG tablet     Sig: Take 1 tablet (15 mg total) by mouth daily as needed for Pain.     Dispense:  30 tablet     Refill:  0     [unfilled]  Orders Placed This Encounter   Procedures    X-Ray Knee Complete 4 or More Views Left     Standing Status:   Future     Standing Expiration Date:   3/25/2025     Order Specific Question:   May the Radiologist modify the order per protocol to meet the clinical needs of the patient?     Answer:   Yes     Order Specific Question:   Release to patient     Answer:   Immediate

## 2024-03-25 NOTE — ASSESSMENT & PLAN NOTE
She feels like the cymbalta is causing blurry vision and blunting her emotion.  I rec she drop to every other day for a week and then every third day for a week, then stop.  Recheck 3 mos.

## 2024-03-26 NOTE — PROGRESS NOTES
Let her know the xray of her knee shows some arthritis.  If she would like further evaluation of the issues she is having with her knee, I'm happy to refer her to orthopedics.

## 2024-04-23 ENCOUNTER — OFFICE VISIT (OUTPATIENT)
Dept: INTERNAL MEDICINE | Facility: CLINIC | Age: 74
End: 2024-04-23
Payer: MEDICARE

## 2024-04-23 VITALS
BODY MASS INDEX: 31.86 KG/M2 | HEIGHT: 67 IN | RESPIRATION RATE: 18 BRPM | HEART RATE: 83 BPM | DIASTOLIC BLOOD PRESSURE: 80 MMHG | WEIGHT: 203 LBS | OXYGEN SATURATION: 98 % | SYSTOLIC BLOOD PRESSURE: 134 MMHG

## 2024-04-23 DIAGNOSIS — R26.89 IMBALANCE: ICD-10-CM

## 2024-04-23 DIAGNOSIS — G89.29 CHRONIC PAIN OF LEFT KNEE: ICD-10-CM

## 2024-04-23 DIAGNOSIS — R60.0 BILATERAL LOWER EXTREMITY EDEMA: Primary | ICD-10-CM

## 2024-04-23 DIAGNOSIS — E66.09 CLASS 1 OBESITY DUE TO EXCESS CALORIES WITHOUT SERIOUS COMORBIDITY WITH BODY MASS INDEX (BMI) OF 30.0 TO 30.9 IN ADULT: ICD-10-CM

## 2024-04-23 DIAGNOSIS — L23.9 ALLERGIC CONTACT DERMATITIS, UNSPECIFIED TRIGGER: ICD-10-CM

## 2024-04-23 DIAGNOSIS — N39.46 MIXED STRESS AND URGE URINARY INCONTINENCE: ICD-10-CM

## 2024-04-23 DIAGNOSIS — M25.562 CHRONIC PAIN OF LEFT KNEE: ICD-10-CM

## 2024-04-23 PROBLEM — E66.811 CLASS 1 OBESITY DUE TO EXCESS CALORIES WITHOUT SERIOUS COMORBIDITY WITH BODY MASS INDEX (BMI) OF 30.0 TO 30.9 IN ADULT: Status: ACTIVE | Noted: 2024-04-23

## 2024-04-23 PROCEDURE — 1159F MED LIST DOCD IN RCRD: CPT | Mod: CPTII,,, | Performed by: NURSE PRACTITIONER

## 2024-04-23 PROCEDURE — 1101F PT FALLS ASSESS-DOCD LE1/YR: CPT | Mod: CPTII,,, | Performed by: NURSE PRACTITIONER

## 2024-04-23 PROCEDURE — 3288F FALL RISK ASSESSMENT DOCD: CPT | Mod: CPTII,,, | Performed by: NURSE PRACTITIONER

## 2024-04-23 PROCEDURE — 99214 OFFICE O/P EST MOD 30 MIN: CPT | Mod: ,,, | Performed by: NURSE PRACTITIONER

## 2024-04-23 PROCEDURE — 1160F RVW MEDS BY RX/DR IN RCRD: CPT | Mod: CPTII,,, | Performed by: NURSE PRACTITIONER

## 2024-04-23 PROCEDURE — 3079F DIAST BP 80-89 MM HG: CPT | Mod: CPTII,,, | Performed by: NURSE PRACTITIONER

## 2024-04-23 PROCEDURE — 3075F SYST BP GE 130 - 139MM HG: CPT | Mod: CPTII,,, | Performed by: NURSE PRACTITIONER

## 2024-04-23 PROCEDURE — 1125F AMNT PAIN NOTED PAIN PRSNT: CPT | Mod: CPTII,,, | Performed by: NURSE PRACTITIONER

## 2024-04-23 NOTE — PROGRESS NOTES
"Subjective:      Patient ID: Sanjana Rojasman is a 74 y.o. female.    Chief Complaint: Knee Pain (C/o L knee pain/She had an XR last month- showed arthritis/She took 1 mobic and it made her feet swell- she is taking osteobiflex OTC/C/o bilateral feet [id number]- she wears compression stockings and it helps )      HPI: Patient here today for c/o swelling to legs and feet, L>R. Additionally, L knee pain with prior recommendation for referral to Ortho. She would like to proceed with referral today. She has been wearing compression stockings. NSAIDs exacerbated swelling. Denies CP, palpitations, or SOB. No claudication. Knee pain is intermittent and worse with sitting to standing. OAB issues are worsening with Myrbetriq 50mg "not working well". Issues with urinary incontinence. She voids often. Denies dysuria or hematuria. She is requesting alternative for bladder issues. Concerns for h/o LLQ s/p polypectomy during c-scope. S/s of LLQ pain have resolved. She is concerned this is contributing to LLE edema. Requesting US for abdomen. No abdominal issues.  Some discoloration to R medial aspect of ankle.  No itching or irritation.    Review of patient's allergies indicates:  No Known Allergies    Review of Systems  Constitutional: No fever, No chills, No sweats, No fatigue, No weight loss.  Respiratory: No shortness of breath, No cough, No sputum production, No wheezing, No exertional dyspnea.   Cardiovascular: No chest pain, No palpitations, No claudication, No orthopnea, BLE peripheral edema, L>R.  Gastrointestinal: No nausea, No vomiting, No diarrhea, No rectal bleeding, No constipation, No abdominal pain.  Genitourinary: No dysuria, No hematuria, frequency/incontinence  Musculoskeletal: No joint pain, No muscle pain.  Integumentary: R inner ankle rash, No ecchymosis.    Objective:   Visit Vitals  /80 (BP Location: Right arm, Patient Position: Sitting, BP Method: Small (Manual))   Pulse 83   Resp 18   Ht 5' " "6.93" (1.7 m)   Wt 92.1 kg (203 lb)   SpO2 98%   BMI 31.86 kg/m²     The patient's weight trend is below:   Wt Readings from Last 4 Encounters:   04/23/24 92.1 kg (203 lb)   03/25/24 92.1 kg (203 lb)   01/12/24 89.4 kg (197 lb)   11/15/23 90.7 kg (200 lb)        Physical Exam  Vitals and nursing note reviewed.   Constitutional:       General: She is not in acute distress.     Appearance: Normal appearance. She is obese. She is not ill-appearing, toxic-appearing or diaphoretic.   HENT:      Head: Normocephalic and atraumatic.   Cardiovascular:      Rate and Rhythm: Normal rate and regular rhythm.      Heart sounds: Normal heart sounds.   Pulmonary:      Effort: Pulmonary effort is normal.      Breath sounds: Normal breath sounds.   Abdominal:      General: Abdomen is flat. Bowel sounds are normal. There is no distension.      Palpations: Abdomen is soft. There is no mass.      Tenderness: There is no abdominal tenderness. There is no guarding or rebound.      Hernia: No hernia is present.   Skin:     General: Skin is warm and dry.      Findings: Rash (eryrthematous mildly raised to R inner aspect of ankle) present.   Neurological:      General: No focal deficit present.      Mental Status: She is alert and oriented to person, place, and time. Mental status is at baseline.         Assessment/Plan:   1. Bilateral lower extremity edema  Will eval with BLE venous US  Advised low salt diet with inc in water  Continued use of compression hose daily  No offensive medications noted  Consider referral to Vasc Sx given concerns of swelling and mild, asymptomatic varicose veins    - US Lower Extremity Veins Bilateral; Future    2. Chronic pain of left knee  Refer to Ortho  Reviewed XR L knee    - Ambulatory referral/consult to Orthopedics; Future    3. Imbalance  Recommend regular exercise  Will avoid change in Myrbetriq to Ditropan for this reason    4. Mixed stress and urge urinary incontinence  Refer to Urology    - " Ambulatory referral/consult to Urology; Future    5. Allergic contact dermatitis, unspecified trigger  May attempt hydrocortisone OTC cream   Follow up as needed    6. Class 1 obesity due to excess calories without serious comorbidity with body mass index (BMI) of 30.0 to 30.9 in adult  HEALTH EDUCATION RECOMMENDATIONS:  weight control, diet and cholesterol, exercise 150 minutes per week, stress reduction, and adequate sleep 6-8 hours per night        Medication List with Changes/Refills   Current Medications    CHOLECALCIFEROL, VITAMIN D3, 125 MCG (5,000 UNIT) TAB    Take 5,000 Units by mouth.    FLUTICASONE PROPIONATE (FLONASE) 50 MCG/ACTUATION NASAL SPRAY    2 sprays (100 mcg total) by Each Nostril route once daily.    GINKGO BILOBA ORAL    ginkgo biloba Take No date recorded No form recorded No frequency recorded No route recorded No set duration recorded No set duration amount recorded suspended No dosage strength recorded No dosage strength units of measure recorded    GLUCOSAMINE/CHONDR MAHAN A SOD (OSTEO BI-FLEX ORAL)    Take 2 tablets by mouth once daily.    LEVOCETIRIZINE (XYZAL) 5 MG TABLET    TAKE 1 TABLET BY MOUTH EVERY EVENING........ALLERGY    LUTEIN ORAL    Take by mouth.    MULTIVITAMIN (THERAGRAN) PER TABLET    Take 1 tablet by mouth once daily.    MYRBETRIQ 50 MG TB24    TAKE 1 TABLET (50 MG TOTAL) BY MOUTH ONCE DAILY........BLADDER    RALOXIFENE (EVISTA) 60 MG TABLET    TAKE 1 TABLET (60 MG TOTAL) BY MOUTH ONCE DAILY.    TURMERIC (CURCUMIN MISC)    by Misc.(Non-Drug; Combo Route) route.    ZEAXANTHIN, BULK, MISC    by Misc.(Non-Drug; Combo Route) route.   Discontinued Medications    MELOXICAM (MOBIC) 15 MG TABLET    Take 1 tablet (15 mg total) by mouth daily as needed for Pain.        No follow-ups on file.    Chemistry:  Lab Results   Component Value Date     11/13/2023    K 4.3 11/13/2023    CHLORIDE 107 11/13/2023    BUN 10.0 11/13/2023    CREATININE 0.69 11/13/2023    EGFRNORACEVR >60  "11/13/2023    GLUCOSE 117 (H) 11/13/2023    CALCIUM 9.0 11/13/2023    ALKPHOS 77 11/13/2023    LABPROT 6.7 11/13/2023    ALBUMIN 3.7 11/13/2023    BILIDIR 0.2 11/04/2021    IBILI 0.30 11/04/2021    AST 32 11/13/2023    ALT 41 11/13/2023    KNVERARO64PM 37.7 09/23/2020        Lab Results   Component Value Date    HGBA1C 5.5 11/15/2023        Hematology:  Lab Results   Component Value Date    WBC 7.2 01/17/2023    HGB 13.9 01/17/2023    HCT 42.7 01/17/2023     01/17/2023       Lipid Panel:  Lab Results   Component Value Date    CHOL 181 11/13/2023    HDL 47 11/13/2023    .00 11/13/2023    TRIG 110 11/13/2023    TOTALCHOLEST 4 11/13/2023        Urine:  No results found for: "COLORUA", "APPEARANCEUA", "SGUA", "PHUA", "PROTEINUA", "GLUCOSEUA", "KETONESUA", "BLOODUA", "NITRITESUA", "LEUKOCYTESUR", "RBCUA", "WBCUA", "BACTERIA", "SQEPUA", "HYALINECASTS", "CREATRANDUR", "PROTEINURINE", "UPROTCREA"     "

## 2024-05-10 ENCOUNTER — OFFICE VISIT (OUTPATIENT)
Dept: ORTHOPEDICS | Facility: CLINIC | Age: 74
End: 2024-05-10
Payer: MEDICARE

## 2024-05-10 VITALS
DIASTOLIC BLOOD PRESSURE: 80 MMHG | WEIGHT: 203.06 LBS | HEART RATE: 77 BPM | SYSTOLIC BLOOD PRESSURE: 145 MMHG | HEIGHT: 66 IN | BODY MASS INDEX: 32.64 KG/M2

## 2024-05-10 DIAGNOSIS — M25.562 CHRONIC PAIN OF LEFT KNEE: ICD-10-CM

## 2024-05-10 DIAGNOSIS — G89.29 CHRONIC PAIN OF LEFT KNEE: ICD-10-CM

## 2024-05-10 DIAGNOSIS — M17.12 PRIMARY OSTEOARTHRITIS OF LEFT KNEE: Primary | ICD-10-CM

## 2024-05-10 PROCEDURE — 3288F FALL RISK ASSESSMENT DOCD: CPT | Mod: CPTII,,, | Performed by: ORTHOPAEDIC SURGERY

## 2024-05-10 PROCEDURE — 20610 DRAIN/INJ JOINT/BURSA W/O US: CPT | Mod: LT,,, | Performed by: NURSE PRACTITIONER

## 2024-05-10 PROCEDURE — 99204 OFFICE O/P NEW MOD 45 MIN: CPT | Mod: ,,, | Performed by: ORTHOPAEDIC SURGERY

## 2024-05-10 PROCEDURE — 1159F MED LIST DOCD IN RCRD: CPT | Mod: CPTII,,, | Performed by: ORTHOPAEDIC SURGERY

## 2024-05-10 PROCEDURE — 3079F DIAST BP 80-89 MM HG: CPT | Mod: CPTII,,, | Performed by: ORTHOPAEDIC SURGERY

## 2024-05-10 PROCEDURE — 1101F PT FALLS ASSESS-DOCD LE1/YR: CPT | Mod: CPTII,,, | Performed by: ORTHOPAEDIC SURGERY

## 2024-05-10 PROCEDURE — 3077F SYST BP >= 140 MM HG: CPT | Mod: CPTII,,, | Performed by: ORTHOPAEDIC SURGERY

## 2024-05-10 RX ORDER — CELECOXIB 200 MG/1
200 CAPSULE ORAL 2 TIMES DAILY
Qty: 60 CAPSULE | Refills: 0 | Status: SHIPPED | OUTPATIENT
Start: 2024-05-10 | End: 2024-06-09

## 2024-05-10 RX ORDER — BETAMETHASONE SODIUM PHOSPHATE AND BETAMETHASONE ACETATE 3; 3 MG/ML; MG/ML
12 INJECTION, SUSPENSION INTRA-ARTICULAR; INTRALESIONAL; INTRAMUSCULAR; SOFT TISSUE
Status: DISCONTINUED | OUTPATIENT
Start: 2024-05-10 | End: 2024-05-10 | Stop reason: HOSPADM

## 2024-05-10 RX ORDER — LIDOCAINE HYDROCHLORIDE 20 MG/ML
5 INJECTION, SOLUTION EPIDURAL; INFILTRATION; INTRACAUDAL; PERINEURAL
Status: DISCONTINUED | OUTPATIENT
Start: 2024-05-10 | End: 2024-05-10 | Stop reason: HOSPADM

## 2024-05-10 RX ADMIN — BETAMETHASONE SODIUM PHOSPHATE AND BETAMETHASONE ACETATE 12 MG: 3; 3 INJECTION, SUSPENSION INTRA-ARTICULAR; INTRALESIONAL; INTRAMUSCULAR; SOFT TISSUE at 09:05

## 2024-05-10 RX ADMIN — LIDOCAINE HYDROCHLORIDE 5 ML: 20 INJECTION, SOLUTION EPIDURAL; INFILTRATION; INTRACAUDAL; PERINEURAL at 09:05

## 2024-05-10 NOTE — PROGRESS NOTES
Chief Complaint:   Chief Complaint   Patient presents with    Left Knee - Pain     Left knee pain, has been going on since January, states has been using Voltaren gel and ibuprofen for relief, WBAT, states pain is starting to increase, not sure what would have caused pain, has been having swelling in knee as well, has no other complaints at this time,        History of present illness:    History of Present Illness  The patient is a 74-year-old male who presents for evaluation of multiple medical concerns.    The patient has been experiencing discomfort and swelling in the left knee since 01/2024, without any precipitating event. The onset of the pain was the day after his consultation with Dr. Kiran in 01/2024, with no history of falls or excessive exercise. He has attempted to manage the pain with Voltaren, but discontinued its use after one day due to foot swelling. He also takes 600 mg of ibuprofen as needed for pain management. His current regimen includes castor oil, potassium, Voltaren, turmeric, bromelain, and various other remedies. Despite attempts to alleviate the pain, there has been no improvement since 01/2024. The patient attributes his symptoms to his overweight status, noting that he has never been this heavy before. He sleeps on his back and keeps his leg straight, which exacerbates the pain, necessitating movement. He expresses anxiety about taking medications for his age. He has noticed an unusual appearance on the left side of his body, as reported by Dr. Kiran. The pain is intermittent, and he walks with a limp, even when not in pain. He also reports a sudden onset of catching in the knee while walking. His balance has been poor for several years. His mobility has been limited due to knee pain, and he frequently experiences back pain, for which he uses pedaling exercises while seated. He expresses concern about the potential impact of these exercises on his knee.    The patient's finger was  significantly swollen this morning, with intermittent pain. He is right-handed and is seeking to ascertain any potential measures to exacerbate his condition.   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 He retired in 2020.    Past Medical History:   Diagnosis Date    Ductal carcinoma in situ of breast 12/11/2013    right breast    Herpes zoster 11/14/2022    DARI (obstructive sleep apnea)     DARI (obstructive sleep apnea)     uses CPAP    Osteoporosis 11/14/2022    Personal history of colonic polyps 12/18/2020    Primary hypertension 3/29/2022    Shingles        Past Surgical History:   Procedure Laterality Date    COLONOSCOPY W/ POLYPECTOMY  12/18/2020    COSMETIC SURGERY  Eyelid 7/31/23    EYE SURGERY  Cataract both eyes 1/25/23    MASTECTOMY Bilateral        Current Outpatient Medications   Medication Sig    cholecalciferol, vitamin D3, 125 mcg (5,000 unit) Tab Take 5,000 Units by mouth.    fluticasone propionate (FLONASE) 50 mcg/actuation nasal spray 2 sprays (100 mcg total) by Each Nostril route once daily.    GINKGO BILOBA ORAL ginkgo biloba Take No date recorded No form recorded No frequency recorded No route recorded No set duration recorded No set duration amount recorded suspended No dosage strength recorded No dosage strength units of measure recorded    glucosamine/chondr montelongo A sod (OSTEO BI-FLEX ORAL) Take 2 tablets by mouth once daily.    levocetirizine (XYZAL) 5 MG tablet TAKE 1 TABLET BY MOUTH EVERY EVENING........ALLERGY    LUTEIN ORAL Take by mouth.    multivitamin (THERAGRAN) per tablet Take 1 tablet by mouth once daily.    MYRBETRIQ 50 mg Tb24 TAKE 1 TABLET (50 MG TOTAL) BY MOUTH ONCE DAILY........BLADDER    raloxifene (EVISTA) 60 mg tablet TAKE 1 TABLET (60 MG TOTAL) BY MOUTH ONCE DAILY.    turmeric (CURCUMIN MISC) by Misc.(Non-Drug; Combo Route) route.    ZEAXANTHIN,  BULK, MISC by Misc.(Non-Drug; Combo Route) route.     No current facility-administered medications for this visit.       Review of patient's allergies indicates:  No Known Allergies    Family History   Problem Relation Name Age of Onset    Alzheimer's disease Mother         Social History     Socioeconomic History    Marital status:    Tobacco Use    Smoking status: Never    Smokeless tobacco: Never   Substance and Sexual Activity    Alcohol use: Yes     Alcohol/week: 12.0 standard drinks of alcohol     Types: 10 Glasses of wine, 2 Cans of beer per week    Drug use: Never    Sexual activity: Not Currently     Birth control/protection: Post-menopausal   Social History Narrative    ** Merged History Encounter **          Social Determinants of Health     Financial Resource Strain: Medium Risk (1/8/2024)    Overall Financial Resource Strain (CARDIA)     Difficulty of Paying Living Expenses: Somewhat hard   Food Insecurity: No Food Insecurity (1/8/2024)    Hunger Vital Sign     Worried About Running Out of Food in the Last Year: Never true     Ran Out of Food in the Last Year: Never true   Transportation Needs: No Transportation Needs (1/8/2024)    PRAPARE - Transportation     Lack of Transportation (Medical): No     Lack of Transportation (Non-Medical): No   Physical Activity: Unknown (1/8/2024)    Exercise Vital Sign     Days of Exercise per Week: 2 days   Stress: Stress Concern Present (1/8/2024)    [employer] - Occupational Stress Questionnaire     Feeling of Stress : To some extent   Housing Stability: Low Risk  (1/8/2024)    Housing Stability Vital Sign     Unable to Pay for Housing in the Last Year: No     Number of Places Lived in the Last Year: 1     Unstable Housing in the Last Year: No           Review of Systems:    Constitution: Negative for chills, fever, and sweats.  Negative for unexplained weight loss.    HENT:  Negative for headaches and blurry  vision.    Cardiovascular:Negative for chest pain or irregular heart beat. Negative for hypertension.    Respiratory:  Negative for cough and shortness of breath.    Gastrointestinal: Negative for abdominal pain, heartburn, melena, nausea, and vomitting.    Genitourinary:  Negative bladder incontinence and dysuria.    Musculoskeletal:  See HPI    Neurological: Negative for numbness.    Psychiatric/Behavioral: Negative for depression.  The patient is not nervous/anxious.      Endocrine: Negative for polyuria    Hematologic/Lymphatic: Negative for bleeding problem.  Does not bruise/bleed easily.    Skin: Negative for poor would healing and rash      Physical Examination:    Vital Signs:    Vitals:    05/10/24 0933   BP: (!) 145/80   Pulse: 77       Body mass index is 32.77 kg/m².    General: No acute distress, alert and oriented, healthy appearing    HEENT: Head is atraumatic, mucous membranes are moist    Neck: Supples, no JVD    Cardiovascular: Palpable dorsalis pedis and posterior tibial pulses, regular rate and rhythm to those pulses    Lungs: Breathing non-labored    Skin: no rashes appreciated    Neurologic: Can flex and extend knees, ankles, and toes. Sensation is grossly intact    Left knee:  Patient was 3+ effusion of the left knee.  She is good range of motion.  It was crepitus range of motion of the left patellofemoral joint.  No significant tenderness noted.  Does have a palpable Baker cyst posteriorly    X-rays:  Three views of the left knee reviewed.  Patient does have maintained joint space although she does have some narrowing of the medial joint line     Assessment::  Left knee osteoarthritis versus meniscus tear    Plan:  Patient has degenerative knee.  We will give her an injection to calm this down.  Also try some Celebrex.  Patient is going to monitor any lower extremity swelling.  See her back in 6 weeks.  Repeat x-rays of her left knee when she returns.    This note was generated with the  assistance of ambient listening technology. Verbal consent was obtained by the patient and accompanying visitor(s) for the recording of patient appointment to facilitate this note. I attest to having reviewed and edited the generated note for accuracy, though some syntax or spelling errors may persist. Please contact the author of this note for any clarification.      This note was created using [employer] voice recognition software that occasionally misinterpreted phrases or words.    Consult note is delivered via Epic messaging service.

## 2024-05-10 NOTE — PROCEDURES
Large Joint Aspiration/Injection: L knee    Date/Time: 5/10/2024 9:30 AM    Performed by: Shama Bailey FNP  Authorized by: Shai Bishop MD    Consent Done?:  Yes (Verbal)  Indications:  Arthritis and pain  Timeout: prior to procedure the correct patient, procedure, and site was verified    Prep: patient was prepped and draped in usual sterile fashion      Details:  Needle Size:  22 G  Approach:  Anterolateral  Location:  Knee  Site:  L knee  Medications:  5 mL LIDOcaine (PF) 20 mg/mL (2%) 20 mg/mL (2 %); 12 mg betamethasone acetate-betamethasone sodium phosphate 6 mg/mL

## 2024-05-22 DIAGNOSIS — Z78.0 POST-MENOPAUSE: ICD-10-CM

## 2024-05-22 RX ORDER — RALOXIFENE HYDROCHLORIDE 60 MG/1
60 TABLET, FILM COATED ORAL
Qty: 90 TABLET | Refills: 3 | Status: SHIPPED | OUTPATIENT
Start: 2024-05-22

## 2024-06-25 ENCOUNTER — OFFICE VISIT (OUTPATIENT)
Dept: ORTHOPEDICS | Facility: CLINIC | Age: 74
End: 2024-06-25
Payer: MEDICARE

## 2024-06-25 ENCOUNTER — HOSPITAL ENCOUNTER (OUTPATIENT)
Dept: RADIOLOGY | Facility: CLINIC | Age: 74
Discharge: HOME | End: 2024-06-25
Attending: ORTHOPAEDIC SURGERY
Payer: MEDICARE

## 2024-06-25 VITALS
BODY MASS INDEX: 31.02 KG/M2 | WEIGHT: 193 LBS | HEIGHT: 66 IN | DIASTOLIC BLOOD PRESSURE: 81 MMHG | HEART RATE: 80 BPM | SYSTOLIC BLOOD PRESSURE: 128 MMHG

## 2024-06-25 DIAGNOSIS — M25.562 CHRONIC PAIN OF LEFT KNEE: Primary | ICD-10-CM

## 2024-06-25 DIAGNOSIS — G89.29 CHRONIC PAIN OF LEFT KNEE: ICD-10-CM

## 2024-06-25 DIAGNOSIS — G89.29 CHRONIC PAIN OF LEFT KNEE: Primary | ICD-10-CM

## 2024-06-25 DIAGNOSIS — M25.562 CHRONIC PAIN OF LEFT KNEE: ICD-10-CM

## 2024-06-25 PROCEDURE — 1159F MED LIST DOCD IN RCRD: CPT | Mod: CPTII,,, | Performed by: ORTHOPAEDIC SURGERY

## 2024-06-25 PROCEDURE — 1101F PT FALLS ASSESS-DOCD LE1/YR: CPT | Mod: CPTII,,, | Performed by: ORTHOPAEDIC SURGERY

## 2024-06-25 PROCEDURE — 3079F DIAST BP 80-89 MM HG: CPT | Mod: CPTII,,, | Performed by: ORTHOPAEDIC SURGERY

## 2024-06-25 PROCEDURE — 99213 OFFICE O/P EST LOW 20 MIN: CPT | Mod: ,,, | Performed by: ORTHOPAEDIC SURGERY

## 2024-06-25 PROCEDURE — 73564 X-RAY EXAM KNEE 4 OR MORE: CPT | Mod: LT,,, | Performed by: ORTHOPAEDIC SURGERY

## 2024-06-25 PROCEDURE — 3288F FALL RISK ASSESSMENT DOCD: CPT | Mod: CPTII,,, | Performed by: ORTHOPAEDIC SURGERY

## 2024-06-25 PROCEDURE — 3074F SYST BP LT 130 MM HG: CPT | Mod: CPTII,,, | Performed by: ORTHOPAEDIC SURGERY

## 2024-06-25 RX ORDER — CELECOXIB 200 MG/1
200 CAPSULE ORAL 2 TIMES DAILY
Qty: 60 CAPSULE | Refills: 0 | Status: SHIPPED | OUTPATIENT
Start: 2024-06-25 | End: 2024-07-25

## 2024-06-25 NOTE — PROGRESS NOTES
Chief Complaint:   Chief Complaint   Patient presents with    Follow-up     F/u for left knee pain/steroid injection given 5/10/24. States injection was able to give some relief and is still helping her. Taking celebrex mainly one per day which is helping as well. Xr today.       History of present illness:    History of Present Illness  The patient presents for evaluation of knee pain.    The patient reports an improvement in her knee condition, with no current pain. She has found relief following an injection and Celebrex, which she continues to take. Despite being prescribed Celebrex twice daily, she often only takes one tablet on certain days. She expresses apprehension about discontinuing Celebrex and resuming its use. She requests a refill of her Celebrex prescription. She queries about the potential benefits of glucosamine and chondroitin. She owns a full bottle of glucosamine and chondroitin at home. She continues to perform pedaling exercises without any complications. However, she has not attempted squatting due to fear of overextending her knee.    Supplemental Information  She has a cyst on the back of her neck.    Past Medical History:   Diagnosis Date    Ductal carcinoma in situ of breast 12/11/2013    right breast    Herpes zoster 11/14/2022    DARI (obstructive sleep apnea)     DARI (obstructive sleep apnea)     uses CPAP    Osteoporosis 11/14/2022    Personal history of colonic polyps 12/18/2020    Primary hypertension 3/29/2022    Shingles        Past Surgical History:   Procedure Laterality Date    COLONOSCOPY W/ POLYPECTOMY  12/18/2020    COSMETIC SURGERY  Eyelid 7/31/23    EYE SURGERY  Cataract both eyes 1/25/23    MASTECTOMY Bilateral        Current Outpatient Medications   Medication Sig    cholecalciferol, vitamin D3, 125 mcg (5,000 unit) Tab Take 5,000 Units by mouth.    fluticasone propionate (FLONASE) 50 mcg/actuation nasal spray 2 sprays (100 mcg total) by Each Nostril route once daily.     GINKGO BILOBA ORAL ginkgo biloba Take No date recorded No form recorded No frequency recorded No route recorded No set duration recorded No set duration amount recorded suspended No dosage strength recorded No dosage strength units of measure recorded    glucosamine/chondr montelongo A sod (OSTEO BI-FLEX ORAL) Take 2 tablets by mouth once daily.    levocetirizine (XYZAL) 5 MG tablet TAKE 1 TABLET BY MOUTH EVERY EVENING........ALLERGY    LUTEIN ORAL Take by mouth.    multivitamin (THERAGRAN) per tablet Take 1 tablet by mouth once daily.    MYRBETRIQ 50 mg Tb24 TAKE 1 TABLET (50 MG TOTAL) BY MOUTH ONCE DAILY........BLADDER    raloxifene (EVISTA) 60 mg tablet TAKE ONE TABLET BY MOUTH ONCE DAILY    turmeric (CURCUMIN MISC) by Misc.(Non-Drug; Combo Route) route.    ZEAXANTHIN, BULK, MISC by Misc.(Non-Drug; Combo Route) route.     No current facility-administered medications for this visit.       Review of patient's allergies indicates:  No Known Allergies    Family History   Problem Relation Name Age of Onset    Alzheimer's disease Mother      No Known Problems Father         Social History     Socioeconomic History    Marital status:    Tobacco Use    Smoking status: Never    Smokeless tobacco: Never   Substance and Sexual Activity    Alcohol use: Yes     Alcohol/week: 12.0 standard drinks of alcohol     Types: 10 Glasses of wine, 2 Cans of beer per week    Drug use: Never    Sexual activity: Not Currently     Birth control/protection: Post-menopausal   Social History Narrative    ** Merged History Encounter **          Social Determinants of Health     Financial Resource Strain: Medium Risk (1/8/2024)    Overall Financial Resource Strain (CARDIA)     Difficulty of Paying Living Expenses: Somewhat hard   Food Insecurity: No Food Insecurity (1/8/2024)    Hunger Vital Sign     Worried About Running Out of Food in the Last Year: Never true     Ran Out of Food in the Last Year: Never true   Transportation Needs: No  Transportation Needs (1/8/2024)    PRAPARE - Transportation     Lack of Transportation (Medical): No     Lack of Transportation (Non-Medical): No   Physical Activity: Unknown (1/8/2024)    Exercise Vital Sign     Days of Exercise per Week: 2 days   Stress: Stress Concern Present (1/8/2024)    [employer] - Occupational Stress Questionnaire     Feeling of Stress : To some extent   Housing Stability: Low Risk  (1/8/2024)    Housing Stability Vital Sign     Unable to Pay for Housing in the Last Year: No     Number of Places Lived in the Last Year: 1     Unstable Housing in the Last Year: No           Review of Systems:    Constitution: Negative for chills, fever, and sweats.  Negative for unexplained weight loss.    HENT:  Negative for headaches and blurry vision.    Cardiovascular:Negative for chest pain or irregular heart beat. Negative for hypertension.    Respiratory:  Negative for cough and shortness of breath.    Gastrointestinal: Negative for abdominal pain, heartburn, melena, nausea, and vomitting.    Genitourinary:  Negative bladder incontinence and dysuria.    Musculoskeletal:  See HPI    Neurological: Negative for numbness.    Psychiatric/Behavioral: Negative for depression.  The patient is not nervous/anxious.      Endocrine: Negative for polyuria    Hematologic/Lymphatic: Negative for bleeding problem.  Does not bruise/bleed easily.    Skin: Negative for poor would healing and rash      Physical Examination:    Vital Signs:    Vitals:    06/25/24 1302   BP: 128/81   Pulse: 80       Body mass index is 31.15 kg/m².    General: No acute distress, alert and oriented, healthy appearing    HEENT: Head is atraumatic, mucous membranes are moist    Neck: Supples, no JVD    Cardiovascular: Palpable dorsalis pedis and posterior tibial pulses, regular rate and rhythm to those pulses    Lungs: Breathing non-labored    Skin: no rashes appreciated    Neurologic: Can flex and extend knees,  ankles, and toes. Sensation is grossly intact    Bilateral knees:  Brisk cap refill disappeared sensation intact disappeared range of motion her left knee is well-maintained.  She gets full extension.  Excellent range of motion with good flexion.  No significant crepitus felt.    X-rays:  Four views of the left knee reviewed.  Patient with well-maintained joint space.  No significant end-stage arthritis noted of the left knee.     Assessment::  Left knee osteoarthritis/meniscus tear    Plan:  Discussed all treatment with the patient.  Patient with well-maintained joint space.  She was this point in days it was asymptomatic.  We will refill her anti-inflammatories.  She will contact us if she has any worsening.  She was had significant worsening we will get an MRI of her left knee.    This note was generated with the assistance of ambient listening technology. Verbal consent was obtained by the patient and accompanying visitor(s) for the recording of patient appointment to facilitate this note. I attest to having reviewed and edited the generated note for accuracy, though some syntax or spelling errors may persist. Please contact the author of this note for any clarification.      This note was created using [employer] voice recognition software that occasionally misinterpreted phrases or words.    Consult note is delivered via Epic messaging service.

## 2024-06-27 ENCOUNTER — OFFICE VISIT (OUTPATIENT)
Dept: INTERNAL MEDICINE | Facility: CLINIC | Age: 74
End: 2024-06-27
Payer: MEDICARE

## 2024-06-27 VITALS
OXYGEN SATURATION: 97 % | HEIGHT: 66 IN | HEART RATE: 79 BPM | WEIGHT: 193 LBS | SYSTOLIC BLOOD PRESSURE: 122 MMHG | BODY MASS INDEX: 31.02 KG/M2 | DIASTOLIC BLOOD PRESSURE: 64 MMHG | RESPIRATION RATE: 18 BRPM

## 2024-06-27 DIAGNOSIS — J34.89 RHINORRHEA: ICD-10-CM

## 2024-06-27 DIAGNOSIS — M25.569 KNEE PAIN, UNSPECIFIED CHRONICITY, UNSPECIFIED LATERALITY: ICD-10-CM

## 2024-06-27 DIAGNOSIS — R53.83 FATIGUE, UNSPECIFIED TYPE: Primary | ICD-10-CM

## 2024-06-27 PROCEDURE — 3288F FALL RISK ASSESSMENT DOCD: CPT | Mod: CPTII,,, | Performed by: INTERNAL MEDICINE

## 2024-06-27 PROCEDURE — 1159F MED LIST DOCD IN RCRD: CPT | Mod: CPTII,,, | Performed by: INTERNAL MEDICINE

## 2024-06-27 PROCEDURE — 1101F PT FALLS ASSESS-DOCD LE1/YR: CPT | Mod: CPTII,,, | Performed by: INTERNAL MEDICINE

## 2024-06-27 PROCEDURE — 99214 OFFICE O/P EST MOD 30 MIN: CPT | Mod: ,,, | Performed by: INTERNAL MEDICINE

## 2024-06-27 PROCEDURE — 3074F SYST BP LT 130 MM HG: CPT | Mod: CPTII,,, | Performed by: INTERNAL MEDICINE

## 2024-06-27 PROCEDURE — 1160F RVW MEDS BY RX/DR IN RCRD: CPT | Mod: CPTII,,, | Performed by: INTERNAL MEDICINE

## 2024-06-27 PROCEDURE — 3078F DIAST BP <80 MM HG: CPT | Mod: CPTII,,, | Performed by: INTERNAL MEDICINE

## 2024-06-27 PROCEDURE — 1126F AMNT PAIN NOTED NONE PRSNT: CPT | Mod: CPTII,,, | Performed by: INTERNAL MEDICINE

## 2024-06-27 RX ORDER — CETIRIZINE HYDROCHLORIDE 10 MG/1
10 TABLET ORAL DAILY
COMMUNITY

## 2024-06-27 NOTE — ASSESSMENT & PLAN NOTE
II rec she consider trying Astelin in addition to the Flonase.  She is already on Xyzal in addition to zyrtec.  I also mentioned singulair as a possibility.  And I rec ENT eval if not feeling better.

## 2024-07-01 ENCOUNTER — LAB VISIT (OUTPATIENT)
Dept: LAB | Facility: HOSPITAL | Age: 74
End: 2024-07-01
Attending: INTERNAL MEDICINE
Payer: MEDICARE

## 2024-07-01 DIAGNOSIS — R53.83 FATIGUE, UNSPECIFIED TYPE: ICD-10-CM

## 2024-07-01 LAB
ALBUMIN SERPL-MCNC: 3.3 G/DL (ref 3.4–4.8)
ALBUMIN/GLOB SERPL: 1.1 RATIO (ref 1.1–2)
ALP SERPL-CCNC: 68 UNIT/L (ref 40–150)
ALT SERPL-CCNC: 32 UNIT/L (ref 0–55)
ANION GAP SERPL CALC-SCNC: 10 MEQ/L
AST SERPL-CCNC: 26 UNIT/L (ref 5–34)
BACTERIA #/AREA URNS AUTO: ABNORMAL /HPF
BASOPHILS # BLD AUTO: 0.03 X10(3)/MCL
BASOPHILS NFR BLD AUTO: 0.4 %
BILIRUB SERPL-MCNC: 0.6 MG/DL
BILIRUB UR QL STRIP.AUTO: NEGATIVE
BUN SERPL-MCNC: 10.9 MG/DL (ref 9.8–20.1)
CALCIUM SERPL-MCNC: 8.7 MG/DL (ref 8.4–10.2)
CHLORIDE SERPL-SCNC: 107 MMOL/L (ref 98–107)
CLARITY UR: ABNORMAL
CO2 SERPL-SCNC: 23 MMOL/L (ref 23–31)
COLOR UR AUTO: YELLOW
CREAT SERPL-MCNC: 0.72 MG/DL (ref 0.55–1.02)
CREAT/UREA NIT SERPL: 15
EOSINOPHIL # BLD AUTO: 0.13 X10(3)/MCL (ref 0–0.9)
EOSINOPHIL NFR BLD AUTO: 1.7 %
EPI CELLS #/AREA URNS HPF: ABNORMAL /HPF
ERYTHROCYTE [DISTWIDTH] IN BLOOD BY AUTOMATED COUNT: 13.7 % (ref 11.5–17)
GFR SERPLBLD CREATININE-BSD FMLA CKD-EPI: >60 ML/MIN/1.73/M2
GLOBULIN SER-MCNC: 2.9 GM/DL (ref 2.4–3.5)
GLUCOSE SERPL-MCNC: 117 MG/DL (ref 82–115)
GLUCOSE UR QL STRIP: NORMAL
HCT VFR BLD AUTO: 42.5 % (ref 37–47)
HGB BLD-MCNC: 13.7 G/DL (ref 12–16)
HGB UR QL STRIP: NEGATIVE
IMM GRANULOCYTES # BLD AUTO: 0.03 X10(3)/MCL (ref 0–0.04)
IMM GRANULOCYTES NFR BLD AUTO: 0.4 %
KETONES UR QL STRIP: NEGATIVE
LEUKOCYTE ESTERASE UR QL STRIP: 500
LYMPHOCYTES # BLD AUTO: 2.5 X10(3)/MCL (ref 0.6–4.6)
LYMPHOCYTES NFR BLD AUTO: 33 %
MCH RBC QN AUTO: 30.2 PG (ref 27–31)
MCHC RBC AUTO-ENTMCNC: 32.2 G/DL (ref 33–36)
MCV RBC AUTO: 93.8 FL (ref 80–94)
MONOCYTES # BLD AUTO: 0.96 X10(3)/MCL (ref 0.1–1.3)
MONOCYTES NFR BLD AUTO: 12.7 %
MUCOUS THREADS URNS QL MICRO: ABNORMAL /LPF
NEUTROPHILS # BLD AUTO: 3.92 X10(3)/MCL (ref 2.1–9.2)
NEUTROPHILS NFR BLD AUTO: 51.8 %
NITRITE UR QL STRIP: NEGATIVE
NRBC BLD AUTO-RTO: 0 %
PH UR STRIP: 6 [PH]
PLATELET # BLD AUTO: 390 X10(3)/MCL (ref 130–400)
PMV BLD AUTO: 9.2 FL (ref 7.4–10.4)
POTASSIUM SERPL-SCNC: 4.5 MMOL/L (ref 3.5–5.1)
PROT SERPL-MCNC: 6.2 GM/DL (ref 5.8–7.6)
PROT UR QL STRIP: ABNORMAL
RBC # BLD AUTO: 4.53 X10(6)/MCL (ref 4.2–5.4)
RBC #/AREA URNS AUTO: ABNORMAL /HPF
SODIUM SERPL-SCNC: 140 MMOL/L (ref 136–145)
SP GR UR STRIP.AUTO: 1.02 (ref 1–1.03)
SQUAMOUS #/AREA URNS LPF: ABNORMAL /HPF
TSH SERPL-ACNC: 2.14 UIU/ML (ref 0.35–4.94)
UROBILINOGEN UR STRIP-ACNC: NORMAL
WBC # BLD AUTO: 7.57 X10(3)/MCL (ref 4.5–11.5)
WBC #/AREA URNS AUTO: ABNORMAL /HPF

## 2024-07-01 PROCEDURE — 80053 COMPREHEN METABOLIC PANEL: CPT

## 2024-07-01 PROCEDURE — 36415 COLL VENOUS BLD VENIPUNCTURE: CPT

## 2024-07-01 PROCEDURE — 81001 URINALYSIS AUTO W/SCOPE: CPT

## 2024-07-01 PROCEDURE — 84443 ASSAY THYROID STIM HORMONE: CPT

## 2024-07-01 PROCEDURE — 87086 URINE CULTURE/COLONY COUNT: CPT

## 2024-07-01 PROCEDURE — 85025 COMPLETE CBC W/AUTO DIFF WBC: CPT

## 2024-07-01 PROCEDURE — 87077 CULTURE AEROBIC IDENTIFY: CPT | Mod: 91

## 2024-07-02 RX ORDER — PENICILLIN V POTASSIUM 500 MG/1
500 TABLET, FILM COATED ORAL EVERY 8 HOURS
Qty: 15 TABLET | Refills: 0 | Status: SHIPPED | OUTPATIENT
Start: 2024-07-02 | End: 2024-07-07

## 2024-07-02 NOTE — PROGRESS NOTES
Let her know the urine grew Group B strep, which is usually just a genital contaminant.  But her urine did look infected.  So we will go ahead and treat for an infection.  I'll send a rx for for penicillin for her to take.

## 2024-07-03 LAB
BACTERIA UR CULT: ABNORMAL
BACTERIA UR CULT: ABNORMAL

## 2024-09-27 DIAGNOSIS — J30.2 SEASONAL ALLERGIES: ICD-10-CM

## 2024-09-30 RX ORDER — LEVOCETIRIZINE DIHYDROCHLORIDE 5 MG/1
TABLET, FILM COATED ORAL
Qty: 90 TABLET | Refills: 3 | Status: SHIPPED | OUTPATIENT
Start: 2024-09-30

## 2024-10-25 ENCOUNTER — PATIENT MESSAGE (OUTPATIENT)
Dept: NEUROLOGY | Facility: CLINIC | Age: 74
End: 2024-10-25
Payer: MEDICARE

## 2024-10-31 ENCOUNTER — PATIENT MESSAGE (OUTPATIENT)
Dept: NEUROLOGY | Facility: CLINIC | Age: 74
End: 2024-10-31

## 2024-10-31 ENCOUNTER — OFFICE VISIT (OUTPATIENT)
Dept: NEUROLOGY | Facility: CLINIC | Age: 74
End: 2024-10-31
Payer: MEDICARE

## 2024-10-31 DIAGNOSIS — G47.33 OSA ON CPAP: Primary | ICD-10-CM

## 2024-10-31 PROCEDURE — 1159F MED LIST DOCD IN RCRD: CPT | Mod: CPTII,95,, | Performed by: NURSE PRACTITIONER

## 2024-10-31 PROCEDURE — 1160F RVW MEDS BY RX/DR IN RCRD: CPT | Mod: CPTII,95,, | Performed by: NURSE PRACTITIONER

## 2024-10-31 PROCEDURE — 99213 OFFICE O/P EST LOW 20 MIN: CPT | Mod: 95,,, | Performed by: NURSE PRACTITIONER

## 2024-11-04 ENCOUNTER — TELEPHONE (OUTPATIENT)
Dept: NEUROLOGY | Facility: CLINIC | Age: 74
End: 2024-11-04
Payer: MEDICARE

## 2024-11-04 NOTE — TELEPHONE ENCOUNTER
----- Message from Roberto Sanches AGACNP-BC sent at 10/31/2024 10:11 AM CDT -----  Please set a 3 week reminder to get PAP download

## 2024-11-25 DIAGNOSIS — J30.2 SEASONAL ALLERGIES: ICD-10-CM

## 2024-11-25 RX ORDER — FLUTICASONE PROPIONATE 50 MCG
2 SPRAY, SUSPENSION (ML) NASAL
Qty: 16 G | Refills: 5 | Status: SHIPPED | OUTPATIENT
Start: 2024-11-25

## 2024-12-23 DIAGNOSIS — Z78.0 MENOPAUSE: ICD-10-CM

## 2024-12-23 RX ORDER — MIRABEGRON 50 MG/1
TABLET, FILM COATED, EXTENDED RELEASE ORAL
Qty: 30 TABLET | Refills: 11 | Status: SHIPPED | OUTPATIENT
Start: 2024-12-23

## 2025-01-08 ENCOUNTER — OFFICE VISIT (OUTPATIENT)
Dept: INTERNAL MEDICINE | Facility: CLINIC | Age: 75
End: 2025-01-08
Payer: MEDICARE

## 2025-01-08 VITALS
SYSTOLIC BLOOD PRESSURE: 132 MMHG | WEIGHT: 187 LBS | RESPIRATION RATE: 18 BRPM | HEART RATE: 76 BPM | BODY MASS INDEX: 30.05 KG/M2 | HEIGHT: 66 IN | OXYGEN SATURATION: 100 % | DIASTOLIC BLOOD PRESSURE: 60 MMHG

## 2025-01-08 DIAGNOSIS — M85.80 OSTEOPENIA, UNSPECIFIED LOCATION: ICD-10-CM

## 2025-01-08 DIAGNOSIS — B35.1 ONYCHOMYCOSIS: ICD-10-CM

## 2025-01-08 DIAGNOSIS — E78.5 HYPERLIPIDEMIA, UNSPECIFIED HYPERLIPIDEMIA TYPE: ICD-10-CM

## 2025-01-08 DIAGNOSIS — E66.09 CLASS 1 OBESITY DUE TO EXCESS CALORIES WITHOUT SERIOUS COMORBIDITY WITH BODY MASS INDEX (BMI) OF 30.0 TO 30.9 IN ADULT: ICD-10-CM

## 2025-01-08 DIAGNOSIS — Z00.00 ENCOUNTER FOR MEDICARE ANNUAL WELLNESS EXAM: Primary | ICD-10-CM

## 2025-01-08 DIAGNOSIS — G47.33 OSA ON CPAP: ICD-10-CM

## 2025-01-08 DIAGNOSIS — R73.9 HYPERGLYCEMIA: ICD-10-CM

## 2025-01-08 DIAGNOSIS — E66.811 CLASS 1 OBESITY DUE TO EXCESS CALORIES WITHOUT SERIOUS COMORBIDITY WITH BODY MASS INDEX (BMI) OF 30.0 TO 30.9 IN ADULT: ICD-10-CM

## 2025-01-08 DIAGNOSIS — F33.1 MODERATE EPISODE OF RECURRENT MAJOR DEPRESSIVE DISORDER: ICD-10-CM

## 2025-01-08 DIAGNOSIS — R53.83 FATIGUE, UNSPECIFIED TYPE: ICD-10-CM

## 2025-01-08 RX ORDER — OLOPATADINE HYDROCHLORIDE 2 MG/ML
SOLUTION/ DROPS OPHTHALMIC
COMMUNITY
Start: 2024-11-01

## 2025-01-08 NOTE — ASSESSMENT & PLAN NOTE
Health Maintenance Due   Topic Date Due    Shingles Vaccine (1 of 2) Never done    Pneumococcal Vaccines (Age 50+) (2 of 2 - PCV) 01/01/2012    RSV Vaccine (Age 60+ and Pregnant patients) (1 - 1-dose 75+ series) Never done    COVID-19 Vaccine (3 - 2024-25 season) 09/01/2024     Advance Care Planning     Date: 01/08/2025    Power of   I initiated the process of voluntary advance care planning today and explained the importance of this process to the patient.  I introduced the concept of advance directives to the patient, as well. Then the patient received detailed information about the importance of designating a Health Care Power of  (HCPOA). She was also instructed to communicate with this person about their wishes for future healthcare, should she become sick and lose decision-making capacity. The patient has not previously appointed a HCPOA. After our discussion, the patient has decided to complete a HCPOA and has appointed her  niece , health care agent:  Bouchra Montes De Oca  & health care agent number:  [phone] . I encouraged her to communicate with this person about their wishes for future healthcare, should she become sick and lose decision-making capacity.      A total of 5 min was spent on advance care planning, goals of care discussion, emotional support, formulating and communicating prognosis and exploring burden/benefit of various approaches of treatment. This discussion occurred on a fully voluntary basis with the verbal consent of the patient and/or family.       She states she doesn't do vaccines.

## 2025-01-08 NOTE — PROGRESS NOTES
Subjective:        Patient Care Team:  Caitlyn Colon MD as PCP - General (Internal Medicine)     Chief Complaint: Medicare AWV      HPI:She is here for a medicare wellness.  She c/o excessive eye watering. She doesn't exercise.  She does have some chronic back pain at the level of her waist.  She does do some intermittent fasting and has lost some weight that way.She still has the rhinorrhea.  She uses flonase and xyzal.  She uses a cpap at home.  She never tried the astelin.  Problem Noted   Encounter for Medicare Annual Wellness Exam 11/14/2022   Onychomycosis 1/8/2025   Rhinorrhea 6/27/2024   Fatigue 6/27/2024   Class 1 Obesity Due to Excess Calories Without Serious Comorbidity With Body Mass Index (Bmi) of 30.0 to 30.9 in Adult 4/23/2024   Knee Pain 3/25/2024    Better after injection with Dr. Bishop.     Chronic Back Pain 11/15/2023   Hyperglycemia 11/15/2023   Diffuse Spasm of Esophagus 11/14/2022   Osteoarthritis of Left Hand 11/14/2022   Ulnar Neuritis 11/14/2022   Urinary Incontinence 11/14/2022   History of Ductal Carcinoma in Situ (Dcis) of Breast 11/14/2022   Depression 11/14/2022   Eye Pain 11/14/2022   Sensorineural Hearing Loss (Snhl) of Both Ears 9/24/2019    She sees [facility] -- Anahi Nguyen     Tinnitus of Both Ears 9/24/2019   Syd On Cpap 12/7/2013    She is followed by [facility]     Gerd (Gastroesophageal Reflux Disease) 12/7/2013   Osteopenia 12/7/2013    Her last BMD in 5/21 showed only osteopenia.  She is on Evista.     Osteoporosis (Resolved) 11/14/2022   Herpes Zoster (Resolved) 11/14/2022   Primary Hypertension (Resolved) 3/29/2022   Ductal Carcinoma in Situ of Breast (Resolved) 12/11/2013    right breast          The patient's Health Maintenance was reviewed and the following appears to be due:   Health Maintenance Due   Topic Date Due    Shingles Vaccine (1 of 2) Never done    Pneumococcal Vaccines (Age 50+) (2 of 2 - PCV) 01/01/2012    RSV Vaccine (Age 60+ and  Pregnant patients) (1 - 1-dose 75+ series) Never done    COVID-19 Vaccine (3 - 2024-25 season) 09/01/2024       Problem List  Active Problem List with Overview Notes    Diagnosis Date Noted    Encounter for Medicare annual wellness exam 11/14/2022    Onychomycosis 01/08/2025    Rhinorrhea 06/27/2024    Fatigue 06/27/2024    Class 1 obesity due to excess calories without serious comorbidity with body mass index (BMI) of 30.0 to 30.9 in adult 04/23/2024    Knee pain 03/25/2024     Better after injection with Dr. Bishop.      Chronic back pain 11/15/2023    Hyperglycemia 11/15/2023    Diffuse spasm of esophagus 11/14/2022    Osteoarthritis of left hand 11/14/2022    Ulnar neuritis 11/14/2022    Urinary incontinence 11/14/2022    History of ductal carcinoma in situ (DCIS) of breast 11/14/2022    Depression 11/14/2022    Eye pain 11/14/2022    Sensorineural hearing loss (SNHL) of both ears 09/24/2019     She sees [facility] -- Anahi Nguyen      Tinnitus of both ears 09/24/2019    DARI on CPAP 12/07/2013     She is followed by [facility]      GERD (gastroesophageal reflux disease) 12/07/2013    Osteopenia 12/07/2013     Her last BMD in 5/21 showed only osteopenia.  She is on Evista.         Past Medical History:  Past Medical History:   Diagnosis Date    Ductal carcinoma in situ of breast 12/11/2013    right breast    Herpes zoster 11/14/2022    DARI (obstructive sleep apnea)     DARI (obstructive sleep apnea)     uses CPAP    Osteoporosis 11/14/2022    Personal history of colonic polyps 12/18/2020    Primary hypertension 3/29/2022    Shingles      Past Surgical History:   Procedure Laterality Date    COLONOSCOPY W/ POLYPECTOMY  12/18/2020    COSMETIC SURGERY  Eyelid 7/31/23    EYE SURGERY  Cataract both eyes 1/25/23    MASTECTOMY Bilateral      Review of patient's allergies indicates:  No Known Allergies  Current Outpatient Medications on File Prior to Visit   Medication Sig Dispense Refill     cholecalciferol, vitamin D3, 125 mcg (5,000 unit) Tab Take 5,000 Units by mouth.      fluticasone propionate (FLONASE) 50 mcg/actuation nasal spray SPRAY 2 SPRAYS IN EACH NOSTRIL ONCE DAILY 16 g 5    GINKGO BILOBA ORAL ginkgo biloba Take No date recorded No form recorded No frequency recorded No route recorded No set duration recorded No set duration amount recorded suspended No dosage strength recorded No dosage strength units of measure recorded      glucosamine/chondr montelongo A sod (OSTEO BI-FLEX ORAL) Take 2 tablets by mouth once daily.      levocetirizine (XYZAL) 5 MG tablet TAKE 1 TABLET BY MOUTH EVERY EVENING........ALLERGY 90 tablet 3    LUTEIN ORAL Take by mouth.      multivitamin (THERAGRAN) per tablet Take 1 tablet by mouth once daily.      MYRBETRIQ 50 mg Tb24 TAKE 1 TABLET (50 MG TOTAL) BY MOUTH ONCE DAILY........BLADDER 30 tablet 11    olopatadine (PATADAY ONCE DAILY RELIEF) 0.2 % Drop       raloxifene (EVISTA) 60 mg tablet TAKE ONE TABLET BY MOUTH ONCE DAILY 90 tablet 3    turmeric (CURCUMIN MISC) by Misc.(Non-Drug; Combo Route) route.      ZEAXANTHIN, BULK, MISC by Misc.(Non-Drug; Combo Route) route.      [DISCONTINUED] cetirizine (ZYRTEC) 10 MG tablet Take 10 mg by mouth once daily.       No current facility-administered medications on file prior to visit.     Social History     Socioeconomic History    Marital status:    Tobacco Use    Smoking status: Never    Smokeless tobacco: Never   Substance and Sexual Activity    Alcohol use: Yes     Alcohol/week: 12.0 standard drinks of alcohol     Types: 10 Glasses of wine, 2 Cans of beer per week    Drug use: Never    Sexual activity: Not Currently     Birth control/protection: Post-menopausal   Social History Narrative    ** Merged History Encounter **          Social Drivers of Health     Financial Resource Strain: Medium Risk (1/8/2024)    Overall Financial Resource Strain (CARDIA)     Difficulty of Paying Living Expenses: Somewhat hard   Food  "Insecurity: No Food Insecurity (1/8/2024)    Hunger Vital Sign     Worried About Running Out of Food in the Last Year: Never true     Ran Out of Food in the Last Year: Never true   Transportation Needs: No Transportation Needs (1/8/2024)    PRAPARE - Transportation     Lack of Transportation (Medical): No     Lack of Transportation (Non-Medical): No   Physical Activity: Insufficiently Active (1/8/2025)    Exercise Vital Sign     Days of Exercise per Week: 4 days     Minutes of Exercise per Session: 30 min   Stress: Stress Concern Present (1/8/2024)    [employer] - Occupational Stress Questionnaire     Feeling of Stress : To some extent   Housing Stability: Low Risk  (1/8/2024)    Housing Stability Vital Sign     Unable to Pay for Housing in the Last Year: No     Number of Places Lived in the Last Year: 1     Unstable Housing in the Last Year: No     Family History   Problem Relation Name Age of Onset    Alzheimer's disease Mother      No Known Problems Father         Review of Systems   HENT:  Positive for congestion.    Respiratory: Negative.     Cardiovascular: Negative.    Gastrointestinal: Negative.    Genitourinary:         Overactive bladder, still taking the myrbetriq.   Musculoskeletal:  Positive for back pain.   Skin:         Has fungus on hr great toe nails     Psychiatric/Behavioral:  Positive for dysphoric mood (mild).            Objective:   /60 (BP Location: Right arm, Patient Position: Sitting)   Pulse 76   Resp 18   Ht 5' 5.98" (1.676 m)   Wt 84.8 kg (187 lb)   SpO2 100%   BMI 30.20 kg/m²     Physical Exam  Constitutional:       General: She is not in acute distress.     Appearance: Normal appearance.   HENT:      Head: Normocephalic and atraumatic.   Eyes:      General: No scleral icterus.     Conjunctiva/sclera: Conjunctivae normal.   Neck:      Vascular: No carotid bruit.   Cardiovascular:      Rate and Rhythm: Normal rate and regular rhythm.      Pulses: " Normal pulses.      Heart sounds: Normal heart sounds. No murmur heard.     No friction rub. No gallop.   Pulmonary:      Effort: Pulmonary effort is normal.      Breath sounds: Normal breath sounds.   Abdominal:      General: Bowel sounds are normal.      Palpations: Abdomen is soft. There is no mass.      Tenderness: There is no abdominal tenderness. There is no guarding or rebound.   Musculoskeletal:         General: No deformity.      Cervical back: No rigidity or tenderness.      Right lower leg: No edema.      Left lower leg: No edema.      Comments: Rt ring finger swelling of the pip joint, no redness   Lymphadenopathy:      Cervical: No cervical adenopathy.   Skin:     Coloration: Skin is not jaundiced or pale.      Findings: No erythema.   Neurological:      General: No focal deficit present.      Mental Status: She is alert and oriented to person, place, and time.      Gait: Gait normal.   Psychiatric:         Mood and Affect: Mood normal.         Behavior: Behavior normal.         Thought Content: Thought content normal.         Judgment: Judgment normal.         Assessment and Plan:     Encounter for Medicare annual wellness exam  Health Maintenance Due   Topic Date Due    Shingles Vaccine (1 of 2) Never done    Pneumococcal Vaccines (Age 50+) (2 of 2 - PCV) 01/01/2012    RSV Vaccine (Age 60+ and Pregnant patients) (1 - 1-dose 75+ series) Never done    COVID-19 Vaccine (3 - 2024-25 season) 09/01/2024     Advance Care Planning    Date: 01/08/2025    Power of   I initiated the process of voluntary advance care planning today and explained the importance of this process to the patient.  I introduced the concept of advance directives to the patient, as well. Then the patient received detailed information about the importance of designating a Health Care Power of  (HCPOA). She was also instructed to communicate with this person about their wishes for future healthcare, should she become sick  and lose decision-making capacity. The patient has not previously appointed a HCPOA. After our discussion, the patient has decided to complete a HCPOA and has appointed her  niece , health care agent:  Bouchra Montes De Oca  & health care agent number:  [phone] . I encouraged her to communicate with this person about their wishes for future healthcare, should she become sick and lose decision-making capacity.      A total of 5 min was spent on advance care planning, goals of care discussion, emotional support, formulating and communicating prognosis and exploring burden/benefit of various approaches of treatment. This discussion occurred on a fully voluntary basis with the verbal consent of the patient and/or family.       She states she doesn't do vaccines.    Onychomycosis  Trial of jublia.    Fatigue  I rec she exercise routinely.    Osteopenia  Continue Evista.Will plan to repeat bmd next year.    Depression  She feels like this is stable and not at the level that requires meds.    Class 1 obesity due to excess calories without serious comorbidity with body mass index (BMI) of 30.0 to 30.9 in adult  I rec regular exercise.   Follow up in about 1 year (around 1/8/2026).    Medications Ordered This Encounter   Medications    efinaconazole (JUBLIA) 10 % [place]     Sig: Apply 1 Application topically once daily.     Dispense:  8 mL     Refill:  6     [unfilled]  Orders Placed This Encounter   Procedures    CBC Auto Differential     Standing Status:   Future     Standing Expiration Date:   3/9/2026    Comprehensive Metabolic Panel     Standing Status:   Future     Standing Expiration Date:   3/9/2026    Lipid Panel     Standing Status:   Future     Standing Expiration Date:   7/8/2026    Hemoglobin A1C     Standing Status:   Future     Standing Expiration Date:   3/9/2026         A comprehensive HEALTH RISK ASSESSMENT was completed today. Results are summarized below:    There are NO EMOTIONAL/SOCIAL CONCERNS identified on  today's screening for Social Isolation, Depression and Anxiety.    There are NO COGNITIVE FUNCTION CONCERNS identified on today's screening.  There are NO FUNCTIONAL OR SAFETY CONCERNS were identified on today's screening for Physical Symptoms, Nutritional, Cognitive Function, Home Safety/Living Situation, Fall Risk, Activities of Daily Living, Independent Activities of Daily Living, Physical Activity, Timed Up and Go test and Whisper test.   The patient reports NO OPIOID PRESCRIPTIONS. This was confirmed through medication reconciliation and the [facility] website.    The patient is NOT A TOBACCO USER.  The patient reports NO SIGNIFICANT ALCOHOL USE.     All Questions regarding food, transportation or housing were not answered today.    Follow up in about 1 year (around 1/8/2026). In addition to their scheduled follow up, the patient has also been instructed to follow up on as needed basis.

## 2025-06-06 DIAGNOSIS — Z78.0 POST-MENOPAUSE: ICD-10-CM

## 2025-06-06 RX ORDER — RALOXIFENE HYDROCHLORIDE 60 MG/1
60 TABLET, FILM COATED ORAL
Qty: 90 TABLET | Refills: 3 | Status: SHIPPED | OUTPATIENT
Start: 2025-06-06

## 2025-07-16 DIAGNOSIS — J30.2 SEASONAL ALLERGIES: ICD-10-CM

## 2025-07-16 RX ORDER — LEVOCETIRIZINE DIHYDROCHLORIDE 5 MG/1
TABLET, FILM COATED ORAL
Qty: 90 TABLET | Refills: 3 | Status: SHIPPED | OUTPATIENT
Start: 2025-07-16